# Patient Record
Sex: FEMALE | Race: WHITE | NOT HISPANIC OR LATINO | Employment: OTHER | URBAN - METROPOLITAN AREA
[De-identification: names, ages, dates, MRNs, and addresses within clinical notes are randomized per-mention and may not be internally consistent; named-entity substitution may affect disease eponyms.]

---

## 2023-06-21 LAB
APPEARANCE, URINE: ABNORMAL
BILIRUBIN, URINE: NEGATIVE
BLOOD, URINE: NEGATIVE
COLOR, URINE: YELLOW
GLUCOSE, URINE: NEGATIVE MG/DL
KETONES, URINE: NEGATIVE MG/DL
LEUKOCYTE ESTERASE, URINE: ABNORMAL
MUCUS, URINE: ABNORMAL /LPF
NITRITE, URINE: NEGATIVE
PH, URINE: 6 (ref 5–8)
PROTEIN, URINE: NEGATIVE MG/DL
RBC, URINE: <1 /HPF (ref 0–5)
SPECIFIC GRAVITY, URINE: 1.02 (ref 1–1.03)
SQUAMOUS EPITHELIAL CELLS, URINE: 9 /HPF
UROBILINOGEN, URINE: <2 MG/DL (ref 0–1.9)
WBC, URINE: 14 /HPF (ref 0–5)

## 2023-06-22 LAB — URINE CULTURE: ABNORMAL

## 2023-08-17 LAB
ALANINE AMINOTRANSFERASE (SGPT) (U/L) IN SER/PLAS: 10 U/L (ref 7–45)
ALBUMIN (G/DL) IN SER/PLAS: 4.2 G/DL (ref 3.4–5)
ALKALINE PHOSPHATASE (U/L) IN SER/PLAS: 62 U/L (ref 33–136)
ANION GAP IN SER/PLAS: 9 MMOL/L (ref 10–20)
ASPARTATE AMINOTRANSFERASE (SGOT) (U/L) IN SER/PLAS: 13 U/L (ref 9–39)
BAND NEUTROPHILS (10*3/UL) BLOOD MANUAL COUNT - WAM: 0.28 X10E9/L (ref 0–0.5)
BASOPHILS (10*3/UL) IN BLOOD BY MANUAL COUNT - WAM: 0 X10E9/L (ref 0–0.1)
BASOPHILS/100 LEUKOCYTES IN BLOOD BY MANUAL COUNT - WAM: 0 %
BILIRUBIN TOTAL (MG/DL) IN SER/PLAS: 0.4 MG/DL (ref 0–1.2)
CALCIUM (MG/DL) IN SER/PLAS: 9.3 MG/DL (ref 8.6–10.3)
CARBON DIOXIDE, TOTAL (MMOL/L) IN SER/PLAS: 25 MMOL/L (ref 21–32)
CHLORIDE (MMOL/L) IN SER/PLAS: 110 MMOL/L (ref 98–107)
CREATININE (MG/DL) IN SER/PLAS: 1.09 MG/DL (ref 0.5–1.05)
EOSINOPHILS (10*3/UL) IN BLOOD BY MANUAL COUNT - WAM: 0.14 X10E9/L (ref 0–0.4)
EOSINOPHILS/100 LEUKOCYTES IN BLOOD BY MANUAL COUNT - WAM: 1 % (ref 0–6)
ERYTHROCYTE DISTRIBUTION WIDTH (RATIO) BY AUTOMATED COUNT: 14.1 % (ref 11.5–14.5)
ERYTHROCYTE MEAN CORPUSCULAR HEMOGLOBIN CONCENTRATION (G/DL) BY AUTOMATED: 31.7 G/DL (ref 32–36)
ERYTHROCYTE MEAN CORPUSCULAR VOLUME (FL) BY AUTOMATED COUNT: 96 FL (ref 80–100)
ERYTHROCYTES (10*6/UL) IN BLOOD BY AUTOMATED COUNT: 4.36 X10E12/L (ref 4–5.2)
GFR FEMALE: 48 ML/MIN/1.73M2
GLUCOSE (MG/DL) IN SER/PLAS: 101 MG/DL (ref 74–99)
HEMATOCRIT (%) IN BLOOD BY AUTOMATED COUNT: 42 % (ref 36–46)
HEMOGLOBIN (G/DL) IN BLOOD: 13.3 G/DL (ref 12–16)
IMMATURE GRANULOCYTES/100 LEUKOCYTES IN BLOOD BY AUTOMATED COUNT: 0.3 % (ref 0–0.9)
LEUKOCYTES (10*3/UL) IN BLOOD BY AUTOMATED COUNT: 14 X10E9/L (ref 4.4–11.3)
LYMPHOCYTES (10*3/UL) IN BLOOD BY MANUAL COUNT - WAM: 7.84 X10E9/L (ref 0.8–3)
LYMPHOCYTES/100 LEUKOCYTES IN BLOOD BY MANUAL COUNT - WAM: 56 % (ref 13–44)
MANUAL DIFFERENTIAL Y/N: ABNORMAL
MONOCYTES (10*3/UL) IN BLOOD BY MANUAL COUNT - WAM: 0.42 X10E9/L (ref 0.05–0.8)
MONOCYTES/100 LEUKOCYTES IN BLOOD BY MANUAL COUNT - WAM: 3 % (ref 2–10)
NEUTROPHILS (SEGS+BANDS) (10*3/UL) MANUAL COUNT - WAM: 5.6 X10E9/L (ref 1.6–5.5)
NEUTROPHILS BAND FORM/100 LEUKOCYTES IN BLOOD BY MANUAL COUNT - WAM: 2 % (ref 0–5)
NRBC (PER 100 WBCS) BY AUTOMATED COUNT: 0 /100 WBC (ref 0–0)
PLATELETS (10*3/UL) IN BLOOD AUTOMATED COUNT: 255 X10E9/L (ref 150–450)
POTASSIUM (MMOL/L) IN SER/PLAS: 4.3 MMOL/L (ref 3.5–5.3)
PROTEIN TOTAL: 6.6 G/DL (ref 6.4–8.2)
RBC MORPHOLOGY IN BLOOD: NORMAL
SEGMENTED NEUTROPHILS (10*3/UL) BLOOD MANUAL - WAM: 5.32 X10E9/L (ref 1.6–5)
SEGMENTED NEUTROPHILS/100 LEUKOCYTES BY MANUAL COUNT -: 38 % (ref 40–80)
SODIUM (MMOL/L) IN SER/PLAS: 140 MMOL/L (ref 136–145)
UREA NITROGEN (MG/DL) IN SER/PLAS: 22 MG/DL (ref 6–23)

## 2023-08-19 LAB — URINE CULTURE: ABNORMAL

## 2023-10-25 ENCOUNTER — TELEPHONE (OUTPATIENT)
Dept: PRIMARY CARE | Facility: CLINIC | Age: 88
End: 2023-10-25
Payer: MEDICARE

## 2023-10-25 DIAGNOSIS — I48.0 PAROXYSMAL ATRIAL FIBRILLATION (MULTI): Primary | ICD-10-CM

## 2023-10-25 NOTE — TELEPHONE ENCOUNTER
Rx Refill Request Telephone Encounter    Name:  Becky Gonzalez  :  [id number]  Medication Name:  Eliquis   Dose : 5 mg   Route :    Frequency : 1 tab twice daily   Quantity : 60  Directions :    Specific Pharmacy location:  [facility]  Date of last appointment:  23  Date of next appointment:    Best number to reach patient:  [phone]

## 2023-11-16 ENCOUNTER — TELEPHONE (OUTPATIENT)
Dept: PRIMARY CARE | Facility: CLINIC | Age: 88
End: 2023-11-16
Payer: MEDICARE

## 2023-11-16 DIAGNOSIS — I25.10 CORONARY ARTERY DISEASE INVOLVING NATIVE HEART WITHOUT ANGINA PECTORIS, UNSPECIFIED VESSEL OR LESION TYPE: Primary | ICD-10-CM

## 2023-11-16 RX ORDER — LOVASTATIN 40 MG/1
40 TABLET ORAL DAILY
COMMUNITY
End: 2023-11-16 | Stop reason: SDUPTHER

## 2023-11-16 RX ORDER — LOVASTATIN 40 MG/1
40 TABLET ORAL DAILY
Qty: 90 TABLET | Refills: 3 | Status: SHIPPED | OUTPATIENT
Start: 2023-11-16 | End: 2024-11-15

## 2023-11-16 NOTE — TELEPHONE ENCOUNTER
Rx Refill Request Telephone Encounter    Name:  Becky Gonzalez  :  [id number]  Medication Name:  Lovastatin  Dose : 40 mg  Route : Tablet  Frequency : 1 per day  Quantity : 3 X 30 tab pack  Directions : Take one tablet daily as directed  Specific Pharmacy location:  [facility], on file  Date of last appointment:  23  Date of next appointment:  N/A

## 2023-12-12 PROBLEM — H35.363 DRUSEN (DEGENERATIVE) OF MACULA, BILATERAL: Status: ACTIVE | Noted: 2023-12-12

## 2023-12-12 PROBLEM — R42 POSTURAL DIZZINESS: Status: ACTIVE | Noted: 2023-12-12

## 2023-12-12 PROBLEM — J31.0 CHRONIC RHINITIS: Status: ACTIVE | Noted: 2023-12-12

## 2023-12-12 PROBLEM — R35.0 URINARY FREQUENCY: Status: ACTIVE | Noted: 2023-12-12

## 2023-12-12 PROBLEM — K64.9 HEMORRHOIDS: Status: ACTIVE | Noted: 2023-12-12

## 2023-12-12 PROBLEM — M51.369 DEGENERATIVE DISC DISEASE, LUMBAR: Status: ACTIVE | Noted: 2023-12-12

## 2023-12-12 PROBLEM — E55.9 VITAMIN D DEFICIENCY: Status: ACTIVE | Noted: 2023-12-12

## 2023-12-12 PROBLEM — M51.36 DEGENERATIVE DISC DISEASE, LUMBAR: Status: ACTIVE | Noted: 2023-12-12

## 2023-12-12 PROBLEM — R10.30 LOWER ABDOMINAL PAIN: Status: ACTIVE | Noted: 2023-12-12

## 2023-12-12 PROBLEM — N63.20 BREAST MASS, LEFT: Status: ACTIVE | Noted: 2023-12-12

## 2023-12-12 PROBLEM — E78.00 HYPERCHOLESTEROLEMIA: Chronic | Status: ACTIVE | Noted: 2023-12-12

## 2023-12-12 PROBLEM — E78.00 HYPERCHOLESTEROLEMIA: Status: ACTIVE | Noted: 2023-12-12

## 2023-12-12 PROBLEM — M47.816 FACET DEGENERATION OF LUMBAR REGION: Status: ACTIVE | Noted: 2023-12-12

## 2023-12-12 PROBLEM — I25.10 CORONARY ARTERY DISEASE: Chronic | Status: ACTIVE | Noted: 2023-11-16

## 2023-12-12 PROBLEM — H93.13 SUBJECTIVE TINNITUS OF BOTH EARS: Status: ACTIVE | Noted: 2023-12-12

## 2023-12-12 PROBLEM — M54.42 CHRONIC LEFT-SIDED LOW BACK PAIN WITH LEFT-SIDED SCIATICA: Status: ACTIVE | Noted: 2023-12-12

## 2023-12-12 PROBLEM — R00.1 BRADYCARDIA: Status: ACTIVE | Noted: 2023-12-12

## 2023-12-12 PROBLEM — R26.81 UNSTEADY GAIT: Status: ACTIVE | Noted: 2023-12-12

## 2023-12-12 PROBLEM — H25.11 AGE-RELATED NUCLEAR CATARACT OF RIGHT EYE: Status: ACTIVE | Noted: 2023-12-12

## 2023-12-12 PROBLEM — M25.562 CHRONIC PAIN OF LEFT KNEE: Status: ACTIVE | Noted: 2023-12-12

## 2023-12-12 PROBLEM — N18.31 STAGE 3A CHRONIC KIDNEY DISEASE (MULTI): Status: ACTIVE | Noted: 2023-12-12

## 2023-12-12 PROBLEM — M19.90 ARTHRITIS: Status: ACTIVE | Noted: 2023-12-12

## 2023-12-12 PROBLEM — R06.02 SHORTNESS OF BREATH: Status: ACTIVE | Noted: 2023-12-12

## 2023-12-12 PROBLEM — H60.60 CHRONIC OTITIS EXTERNA: Status: ACTIVE | Noted: 2023-12-12

## 2023-12-12 PROBLEM — K59.09 CONSTIPATION, CHRONIC: Status: ACTIVE | Noted: 2023-12-12

## 2023-12-12 PROBLEM — R13.10 DYSPHAGIA: Status: ACTIVE | Noted: 2023-12-12

## 2023-12-12 PROBLEM — R55 NEAR SYNCOPE: Status: ACTIVE | Noted: 2023-12-12

## 2023-12-12 PROBLEM — R00.2 PALPITATIONS: Status: ACTIVE | Noted: 2023-12-12

## 2023-12-12 PROBLEM — N64.4 BREAST PAIN, LEFT: Status: ACTIVE | Noted: 2023-12-12

## 2023-12-12 PROBLEM — G47.00 INSOMNIA: Status: ACTIVE | Noted: 2023-12-12

## 2023-12-12 PROBLEM — K63.5 COLON POLYPS: Status: ACTIVE | Noted: 2023-12-12

## 2023-12-12 PROBLEM — H25.12 AGE-RELATED NUCLEAR CATARACT OF LEFT EYE: Status: ACTIVE | Noted: 2023-12-12

## 2023-12-12 PROBLEM — J34.89 NASAL OBSTRUCTION: Status: ACTIVE | Noted: 2023-12-12

## 2023-12-12 PROBLEM — R07.9 CHEST PAIN: Status: ACTIVE | Noted: 2023-12-12

## 2023-12-12 PROBLEM — R05.9 COUGH: Status: ACTIVE | Noted: 2023-12-12

## 2023-12-12 PROBLEM — R33.9 INCOMPLETE BLADDER EMPTYING: Status: ACTIVE | Noted: 2023-12-12

## 2023-12-12 PROBLEM — F32.A MILD DEPRESSION: Status: ACTIVE | Noted: 2023-12-12

## 2023-12-12 PROBLEM — H90.3 SENSORINEURAL HEARING LOSS (SNHL) OF BOTH EARS: Status: ACTIVE | Noted: 2023-12-12

## 2023-12-12 PROBLEM — M17.9 OSTEOARTHRITIS OF KNEE: Status: ACTIVE | Noted: 2023-12-12

## 2023-12-12 PROBLEM — G89.29 CHRONIC LEFT-SIDED LOW BACK PAIN WITH LEFT-SIDED SCIATICA: Status: ACTIVE | Noted: 2023-12-12

## 2023-12-12 PROBLEM — G89.29 CHRONIC PAIN OF LEFT KNEE: Status: ACTIVE | Noted: 2023-12-12

## 2023-12-12 PROBLEM — N32.81 OAB (OVERACTIVE BLADDER): Status: ACTIVE | Noted: 2023-12-12

## 2023-12-12 PROBLEM — F41.1 ANXIETY, GENERALIZED: Status: ACTIVE | Noted: 2023-12-12

## 2023-12-12 PROBLEM — F41.8 SITUATIONAL ANXIETY: Status: ACTIVE | Noted: 2023-12-12

## 2023-12-12 PROBLEM — R09.81 NASAL CONGESTION: Status: ACTIVE | Noted: 2023-12-12

## 2023-12-13 ENCOUNTER — OFFICE VISIT (OUTPATIENT)
Dept: CARDIOLOGY | Facility: CLINIC | Age: 88
End: 2023-12-13
Payer: MEDICARE

## 2023-12-13 VITALS
BODY MASS INDEX: 29.38 KG/M2 | WEIGHT: 182 LBS | OXYGEN SATURATION: 98 % | DIASTOLIC BLOOD PRESSURE: 66 MMHG | HEART RATE: 65 BPM | SYSTOLIC BLOOD PRESSURE: 138 MMHG | RESPIRATION RATE: 16 BRPM

## 2023-12-13 DIAGNOSIS — I25.10 CORONARY ARTERY DISEASE INVOLVING NATIVE CORONARY ARTERY OF NATIVE HEART WITHOUT ANGINA PECTORIS: Chronic | ICD-10-CM

## 2023-12-13 DIAGNOSIS — I48.0 PAROXYSMAL ATRIAL FIBRILLATION (MULTI): Primary | ICD-10-CM

## 2023-12-13 DIAGNOSIS — R00.2 PALPITATIONS: ICD-10-CM

## 2023-12-13 PROCEDURE — 99214 OFFICE O/P EST MOD 30 MIN: CPT | Performed by: PHYSICIAN ASSISTANT

## 2023-12-13 PROCEDURE — 1159F MED LIST DOCD IN RCRD: CPT | Performed by: PHYSICIAN ASSISTANT

## 2023-12-13 RX ORDER — CALCIUM CARB/VITAMIN D3/VIT K1 500-500-40
TABLET,CHEWABLE ORAL
COMMUNITY

## 2023-12-13 RX ORDER — LOVASTATIN 40 MG/1
40 TABLET ORAL
COMMUNITY
Start: 2022-03-31 | End: 2023-12-13 | Stop reason: WASHOUT

## 2023-12-13 NOTE — PROGRESS NOTES
Chief Complaint:   Follow-up (6 month)     History Of Present Illness:    Becky Gonzalez is a 89 y.o. female presenting after recently completing a pharmacologic nuclear stress test.  Nuclear imaging displayed normal myocardial perfusion with preserved LV systolic function.  Patient denies chest pain, chest pressure, palpitations, dyspnea on exertion, shortness of breath at rest, diaphoresis, nausea/vomiting, back pain, headache, lightheadedness, dizziness, syncope or presyncopal episodes, active bleeding signs or symptoms, excessive weight gain, muscle or joint pain, claudication.       Last Recorded Vitals:  Vitals:    12/13/23 1411   BP: 138/66   BP Location: Right arm   Pulse: 65   Resp: 16   SpO2: 98%   Weight: 82.6 kg (182 lb)       Past Medical History:  She has a past medical history of Abnormal findings on diagnostic imaging of other specified body structures, Coronary artery disease (11/16/2023), Hypercholesterolemia (12/12/2023), Other specified anxiety disorders (07/19/2021), Personal history of other diseases of the circulatory system, Personal history of other diseases of the nervous system and sense organs, Personal history of other medical treatment, and Personal history of other mental and behavioral disorders.    Past Surgical History:  She has a past surgical history that includes Knee surgery (03/02/2020); Other surgical history (03/16/2022); and Other surgical history (04/28/2020).      Social History:  She has no history on file for tobacco use, alcohol use, and drug use.    Family History:  No family history on file.     Allergies:  Patient has no known allergies.    Outpatient Medications:  Current Outpatient Medications   Medication Instructions    apixaban (ELIQUIS) 5 mg, oral, Every 12 hours    cholecalciferol (Vitamin D-3) 400 unit capsule oral    lovastatin (MEVACOR) 40 mg, oral, Daily, as directed       Physical Exam:  Constitutional: awake and alert, oriented ×3, no apparent  "distress  Skin: warm, dry, good turgor no obvious lesions  Eyes: pupils equal, round, reactive to light, conjunctiva pink and noninjected, no discharge  HENT: normocephalic and atraumatic, mucous membranes moist, trachea midline with no masses/goiter  Cardiovascular: S1/S2 regular, no murmur no rubs/gallops, no carotid bruits, no JVD  Pulmonary: symmetrical chest expansion, lungs are clear to auscultation bilaterally, no wheezes/rales/rhonchi, normal effort  Abdomen: nontender, nondistended, active bowel sounds, no ascites  Extremities: no cyanosis, clubbing, no LE edema no lesions; palpable pedal pulses  Neurologic: cranial nerves II - XII grossly intact, stable gait, no tremor       Last Labs:  CBC -  Lab Results   Component Value Date    WBC 14.0 (H) 08/17/2023    HGB 13.3 08/17/2023    HCT 42.0 08/17/2023    MCV 96 08/17/2023     08/17/2023       CMP -  Lab Results   Component Value Date    CALCIUM 9.3 08/17/2023    PROT 6.6 08/17/2023    ALBUMIN 4.2 08/17/2023    AST 13 08/17/2023    ALT 10 08/17/2023    ALKPHOS 62 08/17/2023    BILITOT 0.4 08/17/2023       LIPID PANEL -   Lab Results   Component Value Date    CHOL 183 01/21/2021    TRIG 142 01/21/2021    HDL 53.4 01/21/2021    CHHDL 3.4 01/21/2021    LDLF 101 (H) 01/21/2021    VLDL 28 01/21/2021       RENAL FUNCTION PANEL -   Lab Results   Component Value Date    GLUCOSE 101 (H) 08/17/2023     08/17/2023    K 4.3 08/17/2023     (H) 08/17/2023    CO2 25 08/17/2023    ANIONGAP 9 (L) 08/17/2023    BUN 22 08/17/2023    CREATININE 1.09 (H) 08/17/2023    CALCIUM 9.3 08/17/2023    ALBUMIN 4.2 08/17/2023        No results found for: \"BNP\", \"HGBA1C\"    Last Cardiology Tests:  ECG:  No results found for this or any previous visit from the past 1095 days.      Echo:  No results found for this or any previous visit from the past 1095 days.      Ejection Fractions:  No results found for: \"EF\"    Cath:  No results found for this or any previous visit from " the past 1095 days.      Stress Test:  Nuclear Stress Test 07/20/2023      Cardiac Imaging:  No results found for this or any previous visit from the past 1095 days.      Assessment/Plan   Problem List Items Addressed This Visit             ICD-10-CM       Cardiac and Vasculature    Coronary artery disease (Chronic) I25.10    Paroxysmal atrial fibrillation (CMS/HCC) - Primary I48.0    Palpitations R00.2     -Negative pharmacologic nuclear stress test for ischemia    -Continue DOAC for CVA prophylaxis    -Continue all other current GDMT as prescribed    -F/U with Dr. Smith as scheduled    JORGE VenegasC

## 2024-05-13 ENCOUNTER — TELEPHONE (OUTPATIENT)
Dept: CARDIOLOGY | Facility: CLINIC | Age: 89
End: 2024-05-13
Payer: MEDICARE

## 2024-05-13 DIAGNOSIS — I25.10 CORONARY ARTERY DISEASE INVOLVING NATIVE CORONARY ARTERY OF NATIVE HEART WITHOUT ANGINA PECTORIS: ICD-10-CM

## 2024-05-13 NOTE — TELEPHONE ENCOUNTER
Pt wanted a appt. This week, not next week [place] be [place] of the country.  She said is having a stressful week and she thinks she might be having a panic attack and she was done talking to me .  She had to go to [facility] the earliest appt I had was with oscar @1300 on the 24 of may.. She also never spoke with her pcp yet about this.  She stressed out

## 2024-05-14 NOTE — TELEPHONE ENCOUNTER
Left detailed message for patient: Her June 5th appt has been rescheduled to 5/24 at !:30pm with LANCE at [place] office. Order has been placed for exercise stress test to be performed before LANCE visit on 5/24. Testing will be in contact to schedule.  Requested call back if any questions/concerns.

## 2024-05-14 NOTE — TELEPHONE ENCOUNTER
Patient stated she was very stressed, nervous yesterday and felt a pain over left breast. Has subsided after taking some deep breaths. States she has been having pains on and off and relates to anxiety. Does have medication to take if she needs it- Propranolol 20mg bid prn anxiety and sob. Neels ok today.  Going to [place] sometime this month- has home there. Will be gone for a couple of months.     Patient has OV w/Dr. Smith on 6/5/24- will need to reschedule and asking if she can be seen within 2 weeks to make sure her heart is ok.

## 2024-05-23 ENCOUNTER — HOSPITAL ENCOUNTER (OUTPATIENT)
Dept: CARDIOLOGY | Facility: CLINIC | Age: 89
Discharge: HOME | End: 2024-05-23
Payer: MEDICARE

## 2024-05-23 ENCOUNTER — HOSPITAL ENCOUNTER (OUTPATIENT)
Dept: RADIOLOGY | Facility: CLINIC | Age: 89
Discharge: HOME | End: 2024-05-23
Payer: MEDICARE

## 2024-05-23 DIAGNOSIS — I25.10 CORONARY ARTERY DISEASE INVOLVING NATIVE CORONARY ARTERY OF NATIVE HEART WITHOUT ANGINA PECTORIS: ICD-10-CM

## 2024-05-23 DIAGNOSIS — R06.02 SHORTNESS OF BREATH: Primary | ICD-10-CM

## 2024-05-23 DIAGNOSIS — E55.9 VITAMIN D DEFICIENCY: ICD-10-CM

## 2024-05-23 DIAGNOSIS — R00.2 PALPITATIONS: Primary | ICD-10-CM

## 2024-05-23 DIAGNOSIS — R07.9 CHEST PAIN: Primary | ICD-10-CM

## 2024-05-23 PROBLEM — S93.601A SPRAIN OF RIGHT FOOT: Status: ACTIVE | Noted: 2023-05-30

## 2024-05-23 PROBLEM — M20.61 DEFORMITY OF TOE OF RIGHT FOOT: Status: ACTIVE | Noted: 2023-05-30

## 2024-05-23 PROBLEM — M76.821 POSTERIOR TIBIAL TENDINITIS OF RIGHT LEG: Status: ACTIVE | Noted: 2023-05-30

## 2024-05-23 PROBLEM — M79.674 PAIN IN TOE OF RIGHT FOOT: Status: ACTIVE | Noted: 2023-05-30

## 2024-05-23 PROBLEM — M25.371 RIGHT ANKLE INSTABILITY: Status: ACTIVE | Noted: 2023-05-30

## 2024-05-23 PROBLEM — K21.9 GASTROESOPHAGEAL REFLUX DISEASE: Status: ACTIVE | Noted: 2019-12-27

## 2024-05-23 PROBLEM — E53.8 VITAMIN B 12 DEFICIENCY: Status: ACTIVE | Noted: 2023-12-04

## 2024-05-23 PROBLEM — N18.9 CHRONIC KIDNEY DISEASE: Status: ACTIVE | Noted: 2022-04-21

## 2024-05-23 PROBLEM — M25.471 SWELLING OF RIGHT ANKLE JOINT: Status: ACTIVE | Noted: 2023-05-30

## 2024-05-23 PROBLEM — M21.41 ACQUIRED PES PLANUS OF RIGHT FOOT: Status: ACTIVE | Noted: 2023-05-30

## 2024-05-23 PROBLEM — M21.42 ACQUIRED PES PLANUS OF LEFT FOOT: Status: ACTIVE | Noted: 2023-05-30

## 2024-05-23 PROBLEM — R32 URINARY INCONTINENCE: Status: ACTIVE | Noted: 2023-12-04

## 2024-05-23 PROBLEM — R03.0 ELEVATED BLOOD PRESSURE READING: Status: ACTIVE | Noted: 2023-12-04

## 2024-05-23 PROBLEM — M19.071 PRIMARY OSTEOARTHRITIS OF RIGHT ANKLE: Status: ACTIVE | Noted: 2023-05-30

## 2024-05-23 PROBLEM — M62.81 MUSCLE WEAKNESS (GENERALIZED): Status: ACTIVE | Noted: 2020-07-02

## 2024-05-23 PROCEDURE — 2500000004 HC RX 250 GENERAL PHARMACY W/ HCPCS (ALT 636 FOR OP/ED): Performed by: INTERNAL MEDICINE

## 2024-05-23 PROCEDURE — 78452 HT MUSCLE IMAGE SPECT MULT: CPT | Performed by: INTERNAL MEDICINE

## 2024-05-23 PROCEDURE — 93018 CV STRESS TEST I&R ONLY: CPT | Performed by: INTERNAL MEDICINE

## 2024-05-23 PROCEDURE — 78452 HT MUSCLE IMAGE SPECT MULT: CPT

## 2024-05-23 PROCEDURE — 93016 CV STRESS TEST SUPVJ ONLY: CPT | Performed by: INTERNAL MEDICINE

## 2024-05-23 PROCEDURE — 93017 CV STRESS TEST TRACING ONLY: CPT

## 2024-05-23 RX ORDER — REGADENOSON 0.08 MG/ML
0.4 INJECTION, SOLUTION INTRAVENOUS ONCE
Status: COMPLETED | OUTPATIENT
Start: 2024-05-23 | End: 2024-05-23

## 2024-05-23 RX ADMIN — REGADENOSON 0.4 MG: 0.08 INJECTION, SOLUTION INTRAVENOUS at 11:37

## 2024-05-24 ENCOUNTER — OFFICE VISIT (OUTPATIENT)
Dept: CARDIOLOGY | Facility: CLINIC | Age: 89
End: 2024-05-24
Payer: MEDICARE

## 2024-05-24 VITALS
DIASTOLIC BLOOD PRESSURE: 80 MMHG | HEIGHT: 66 IN | BODY MASS INDEX: 29.89 KG/M2 | SYSTOLIC BLOOD PRESSURE: 140 MMHG | WEIGHT: 186 LBS

## 2024-05-24 DIAGNOSIS — I48.0 PAROXYSMAL ATRIAL FIBRILLATION (MULTI): ICD-10-CM

## 2024-05-24 DIAGNOSIS — I25.10 CORONARY ARTERY DISEASE INVOLVING NATIVE CORONARY ARTERY OF NATIVE HEART WITHOUT ANGINA PECTORIS: Primary | Chronic | ICD-10-CM

## 2024-05-24 DIAGNOSIS — E78.00 HYPERCHOLESTEROLEMIA: Chronic | ICD-10-CM

## 2024-05-24 DIAGNOSIS — R07.89 OTHER CHEST PAIN: ICD-10-CM

## 2024-05-24 PROCEDURE — 99214 OFFICE O/P EST MOD 30 MIN: CPT | Performed by: PHYSICIAN ASSISTANT

## 2024-05-24 PROCEDURE — 1160F RVW MEDS BY RX/DR IN RCRD: CPT | Performed by: PHYSICIAN ASSISTANT

## 2024-05-24 PROCEDURE — 1159F MED LIST DOCD IN RCRD: CPT | Performed by: PHYSICIAN ASSISTANT

## 2024-05-24 NOTE — PROGRESS NOTES
"Chief Complaint:   Follow-up and Atrial Fibrillation     History Of Present Illness:    5/24/24  Patient completed a pharmacologic nuclear stress test yesterday displaying normal myocardial perfusion in response to chemical stress, stress LVEF 80%.    12/13/23  Becky Gonzalez is a 89 y.o. female presenting after recently completing a pharmacologic nuclear stress test.  Nuclear imaging displayed normal myocardial perfusion with preserved LV systolic function.  Patient denies chest pain, chest pressure, palpitations, dyspnea on exertion, shortness of breath at rest, diaphoresis, nausea/vomiting, back pain, headache, lightheadedness, dizziness, syncope or presyncopal episodes, active bleeding signs or symptoms, excessive weight gain, muscle or joint pain, claudication.       Last Recorded Vitals:  Vitals:    05/24/24 1337   BP: 140/80   BP Location: Right arm   Patient Position: Sitting   BP Cuff Size: Adult   Weight: 84.4 kg (186 lb)   Height: 1.676 m (5' 6\")       Past Medical History:  She has a past medical history of Abnormal findings on diagnostic imaging of other specified body structures, Coronary artery disease (11/16/2023), Hypercholesterolemia (12/12/2023), Other specified anxiety disorders (07/19/2021), Personal history of other diseases of the circulatory system, Personal history of other diseases of the nervous system and sense organs, Personal history of other medical treatment, and Personal history of other mental and behavioral disorders.    Past Surgical History:  She has a past surgical history that includes Knee surgery (03/02/2020); Other surgical history (03/16/2022); and Other surgical history (04/28/2020).      Social History:  She has no history on file for tobacco use, alcohol use, and drug use.    Family History:  No family history on file.     Allergies:  Patient has no known allergies.    Outpatient Medications:  Current Outpatient Medications   Medication Instructions    " "apixaban (ELIQUIS) 5 mg, oral, Every 12 hours    cholecalciferol (Vitamin D-3) 400 unit capsule oral    lovastatin (MEVACOR) 40 mg, oral, Daily, as directed       Physical Exam:  Constitutional: awake and alert, oriented ×3, no apparent distress  Skin: warm, dry, good turgor no obvious lesions  Eyes: pupils equal, round, reactive to light, conjunctiva pink and noninjected, no discharge  HENT: normocephalic and atraumatic, mucous membranes moist, trachea midline with no masses/goiter  Cardiovascular: S1/S2 regular, no murmur no rubs/gallops, no carotid bruits, no JVD  Pulmonary: symmetrical chest expansion, lungs are clear to auscultation bilaterally, no wheezes/rales/rhonchi, normal effort  Abdomen: nontender, nondistended, active bowel sounds, no ascites  Extremities: no cyanosis, clubbing, no LE edema no lesions; palpable pedal pulses  Neurologic: cranial nerves II - XII grossly intact, stable gait, no tremor       Last Labs:  CBC -  Lab Results   Component Value Date    WBC 14.0 (H) 08/17/2023    HGB 13.3 08/17/2023    HCT 42.0 08/17/2023    MCV 96 08/17/2023     08/17/2023       CMP -  Lab Results   Component Value Date    CALCIUM 9.3 08/17/2023    PROT 6.6 08/17/2023    ALBUMIN 4.2 08/17/2023    AST 13 08/17/2023    ALT 10 08/17/2023    ALKPHOS 62 08/17/2023    BILITOT 0.4 08/17/2023       LIPID PANEL -   Lab Results   Component Value Date    CHOL 183 01/21/2021    TRIG 142 01/21/2021    HDL 53.4 01/21/2021    CHHDL 3.4 01/21/2021    LDLF 101 (H) 01/21/2021    VLDL 28 01/21/2021       RENAL FUNCTION PANEL -   Lab Results   Component Value Date    GLUCOSE 101 (H) 08/17/2023     08/17/2023    K 4.3 08/17/2023     (H) 08/17/2023    CO2 25 08/17/2023    ANIONGAP 9 (L) 08/17/2023    BUN 22 08/17/2023    CREATININE 1.09 (H) 08/17/2023    CALCIUM 9.3 08/17/2023    ALBUMIN 4.2 08/17/2023        No results found for: \"BNP\", \"HGBA1C\"    Last Cardiology Tests:  ECG:  No results found for this or any " "previous visit from the past 1095 days.      Echo:  No results found for this or any previous visit from the past 1095 days.      Ejection Fractions:  No results found for: \"EF\"    Cath:  No results found for this or any previous visit from the past 1095 days.      Stress Test:  Nuclear Stress Test 07/20/2023      Cardiac Imaging:  No results found for this or any previous visit from the past 1095 days.      Assessment/Plan   Problem List Items Addressed This Visit             ICD-10-CM       Cardiac and Vasculature    Coronary artery disease - Primary (Chronic) I25.10    Hypercholesterolemia (Chronic) E78.00    Paroxysmal atrial fibrillation (Multi) I48.0    Chest pain R07.9       -Negative pharmacologic nuclear stress test for ischemia    -Continue DOAC for CVA prophylaxis    -Continue all other current GDMT as prescribed    -F/U with Dr. Smith as scheduled    Nikita Hernandez PA-C  "

## 2024-06-05 ENCOUNTER — APPOINTMENT (OUTPATIENT)
Dept: CARDIOLOGY | Facility: CLINIC | Age: 89
End: 2024-06-05
Payer: MEDICARE

## 2024-06-17 ENCOUNTER — APPOINTMENT (OUTPATIENT)
Dept: PRIMARY CARE | Facility: CLINIC | Age: 89
End: 2024-06-17
Payer: MEDICARE

## 2024-06-17 VITALS
WEIGHT: 186 LBS | HEART RATE: 72 BPM | OXYGEN SATURATION: 94 % | BODY MASS INDEX: 30.02 KG/M2 | SYSTOLIC BLOOD PRESSURE: 118 MMHG | TEMPERATURE: 97.3 F | DIASTOLIC BLOOD PRESSURE: 64 MMHG

## 2024-06-17 DIAGNOSIS — N30.00 ACUTE CYSTITIS WITHOUT HEMATURIA: ICD-10-CM

## 2024-06-17 DIAGNOSIS — R19.4 CHANGE IN STOOL HABITS: ICD-10-CM

## 2024-06-17 DIAGNOSIS — I25.10 CORONARY ARTERY DISEASE INVOLVING NATIVE CORONARY ARTERY OF NATIVE HEART WITHOUT ANGINA PECTORIS: Primary | Chronic | ICD-10-CM

## 2024-06-17 DIAGNOSIS — Z00.00 ENCOUNTER FOR ANNUAL PHYSICAL EXAM: ICD-10-CM

## 2024-06-17 DIAGNOSIS — R10.84 GENERALIZED ABDOMINAL PAIN: ICD-10-CM

## 2024-06-17 DIAGNOSIS — I48.0 PAROXYSMAL ATRIAL FIBRILLATION (MULTI): ICD-10-CM

## 2024-06-17 DIAGNOSIS — R35.0 INCREASED URINARY FREQUENCY: ICD-10-CM

## 2024-06-17 DIAGNOSIS — R30.9 PAIN WITH URINATION: ICD-10-CM

## 2024-06-17 LAB
POC APPEARANCE, URINE: CLEAR
POC BILIRUBIN, URINE: NEGATIVE
POC BLOOD, URINE: NEGATIVE
POC COLOR, URINE: YELLOW
POC GLUCOSE, URINE: NEGATIVE MG/DL
POC KETONES, URINE: NEGATIVE MG/DL
POC LEUKOCYTES, URINE: ABNORMAL
POC NITRITE,URINE: NEGATIVE
POC PH, URINE: 6 PH
POC PROTEIN, URINE: ABNORMAL MG/DL
POC SPECIFIC GRAVITY, URINE: 1.02
POC UROBILINOGEN, URINE: 0.2 EU/DL

## 2024-06-17 PROCEDURE — 1036F TOBACCO NON-USER: CPT | Performed by: NURSE PRACTITIONER

## 2024-06-17 PROCEDURE — 1158F ADVNC CARE PLAN TLK DOCD: CPT | Performed by: NURSE PRACTITIONER

## 2024-06-17 PROCEDURE — 82570 ASSAY OF URINE CREATININE: CPT

## 2024-06-17 PROCEDURE — 99214 OFFICE O/P EST MOD 30 MIN: CPT | Performed by: NURSE PRACTITIONER

## 2024-06-17 PROCEDURE — 84156 ASSAY OF PROTEIN URINE: CPT

## 2024-06-17 PROCEDURE — 1123F ACP DISCUSS/DSCN MKR DOCD: CPT | Performed by: NURSE PRACTITIONER

## 2024-06-17 PROCEDURE — 81002 URINALYSIS NONAUTO W/O SCOPE: CPT | Performed by: NURSE PRACTITIONER

## 2024-06-17 PROCEDURE — 1159F MED LIST DOCD IN RCRD: CPT | Performed by: NURSE PRACTITIONER

## 2024-06-17 RX ORDER — OXYBUTYNIN CHLORIDE 5 MG/1
5 TABLET, EXTENDED RELEASE ORAL DAILY
Qty: 30 TABLET | Refills: 11 | Status: SHIPPED | OUTPATIENT
Start: 2024-06-17 | End: 2024-06-21 | Stop reason: SDUPTHER

## 2024-06-17 RX ORDER — NITROFURANTOIN 25; 75 MG/1; MG/1
100 CAPSULE ORAL 2 TIMES DAILY
Qty: 10 CAPSULE | Refills: 0 | Status: SHIPPED | OUTPATIENT
Start: 2024-06-17 | End: 2024-06-22

## 2024-06-17 ASSESSMENT — ENCOUNTER SYMPTOMS
HEMATOLOGIC/LYMPHATIC NEGATIVE: 1
EYES NEGATIVE: 1
ABDOMINAL PAIN: 1
DIZZINESS: 0
FACIAL ASYMMETRY: 0
CARDIOVASCULAR NEGATIVE: 1
NEUROLOGICAL NEGATIVE: 1
WOUND: 0
WEAKNESS: 0
CONSTITUTIONAL NEGATIVE: 1
DIFFICULTY URINATING: 1
PSYCHIATRIC NEGATIVE: 1
MUSCULOSKELETAL NEGATIVE: 1
NERVOUS/ANXIOUS: 0
DIARRHEA: 1
POLYPHAGIA: 0
SLEEP DISTURBANCE: 0
LIGHT-HEADEDNESS: 0
RESPIRATORY NEGATIVE: 1
CONFUSION: 0

## 2024-06-17 ASSESSMENT — PATIENT HEALTH QUESTIONNAIRE - PHQ9
SUM OF ALL RESPONSES TO PHQ9 QUESTIONS 1 AND 2: 0
2. FEELING DOWN, DEPRESSED OR HOPELESS: NOT AT ALL
1. LITTLE INTEREST OR PLEASURE IN DOING THINGS: NOT AT ALL

## 2024-06-17 NOTE — PROGRESS NOTES
"Subjective   Patient ID: Becky Gonzalez is a 89 y.o. female who presents for Establish Care.    ANNEMARIE Pena in a pleasant 89 y/o female presents to clinic to b care     Of note she complaints of worsening Urinary s/s with pain and frequency also states her stool is \" too lose and soft and has abd pain\"         Follows with cardiology:   Dr Smith  Recent stress test completed :   05/2024  Patient completed a pharmacologic nuclear stress test displaying normal myocardial perfusion in response to chemical stress, stress LVEF 80%.    Past Medical History:  GERD, anxiety, depression, HLD, vitamin D deficiency, OA, CKD, Back pain    Past Surgical History:  She has a past surgical history that includes Knee surgery (03/02/2020); Other surgical history (03/16/2022); and Other surgical history (04/28/2020).      Social History:  Tobacco Use   Smoking status: Never   Smokeless tobacco: Never   Vaping Use   Vaping Use: Never used   Substance and Sexual Activity   Alcohol use: Yes   Comment: wine occ.   Drug use: Not on file   Sexual activity: Not on file     Review of Systems   Constitutional: Negative.    HENT:  Positive for hearing loss.    Eyes: Negative.    Respiratory: Negative.     Cardiovascular: Negative.    Gastrointestinal:  Positive for abdominal pain and diarrhea.   Endocrine: Negative for polyphagia.   Genitourinary:  Positive for difficulty urinating, pelvic pain and urgency.   Musculoskeletal: Negative.    Skin: Negative.  Negative for pallor, rash and wound.   Neurological: Negative.  Negative for dizziness, facial asymmetry, weakness and light-headedness.   Hematological: Negative.    Psychiatric/Behavioral: Negative.  Negative for confusion, sleep disturbance and suicidal ideas. The patient is not nervous/anxious.    All other systems reviewed and are negative.      Objective   /64   Pulse 72   Temp 36.3 °C (97.3 °F)   Wt 84.4 kg (186 lb)   SpO2 94%   BMI 30.02 kg/m²     Physical " Exam  Vitals and nursing note reviewed.   Constitutional:       Appearance: Normal appearance. She is normal weight.   HENT:      Head: Normocephalic.      Nose: Nose normal.      Mouth/Throat:      Mouth: Mucous membranes are moist.   Eyes:      Extraocular Movements: Extraocular movements intact.      Conjunctiva/sclera: Conjunctivae normal.      Pupils: Pupils are equal, round, and reactive to light.   Cardiovascular:      Rate and Rhythm: Normal rate and regular rhythm.      Pulses: Normal pulses.      Heart sounds: Normal heart sounds.   Pulmonary:      Effort: Pulmonary effort is normal.   Abdominal:      General: Abdomen is flat. Bowel sounds are normal.      Palpations: Abdomen is soft.   Musculoskeletal:         General: Normal range of motion.      Cervical back: Normal range of motion.   Skin:     General: Skin is warm and dry.   Neurological:      General: No focal deficit present.      Mental Status: She is alert and oriented to person, place, and time.   Psychiatric:         Mood and Affect: Mood normal.         Behavior: Behavior normal.         Assessment/Plan   1. Coronary artery disease involving native coronary artery of native heart without angina pectoris  Stable continue with cardiology     2. Paroxysmal atrial fibrillation (Multi)  Stable continue DOAC as ordered     3. Increased urinary frequency  - POCT UA (nonautomated) manually resulted  - Urine Culture; Future  - oxybutynin XL (Ditropan XL) 5 mg 24 hr tablet; Take 1 tablet (5 mg) by mouth once daily. Do not crush, chew, or split.  Dispense: 30 tablet; Refill: 11  - Referral to Physical Therapy; Future  - CT abdomen pelvis wo IV contrast; Future    4. Pain with urination  - POCT UA (nonautomated) manually resulted  - Urine Culture; Future  - Protein, Urine Random; Future  - CT abdomen pelvis wo IV contrast; Future    5. Encounter for annual physical exam  - Hemoglobin A1C; Future  - CBC; Future  - Comprehensive Metabolic Panel; Future  -  Lipid Panel; Future  - TSH with reflex to Free T4 if abnormal; Future  - Triiodothyronine, Total; Future    6. BMI 30.0-30.9,adult  - CBC; Future    7. Acute cystitis without hematuria  - nitrofurantoin, macrocrystal-monohydrate, (Macrobid) 100 mg capsule; Take 1 capsule (100 mg) by mouth 2 times a day for 5 days.  Dispense: 10 capsule; Refill: 0    8. Change in stool habits  - CT abdomen pelvis wo IV contrast; Future    9. Generalized abdominal pain    - CT abdomen pelvis wo IV contrast; Future      FU in 1 month for bladder/ urinary assessment

## 2024-06-18 LAB
CREAT UR-MCNC: 132.1 MG/DL (ref 20–320)
PROT UR-ACNC: 19 MG/DL (ref 5–24)
PROT/CREAT UR: 0.14 MG/MG CREAT (ref 0–0.17)

## 2024-06-20 ENCOUNTER — HOSPITAL ENCOUNTER (OUTPATIENT)
Dept: RADIOLOGY | Facility: CLINIC | Age: 89
Discharge: HOME | End: 2024-06-20
Payer: MEDICARE

## 2024-06-20 DIAGNOSIS — R35.0 INCREASED URINARY FREQUENCY: ICD-10-CM

## 2024-06-20 DIAGNOSIS — R10.84 GENERALIZED ABDOMINAL PAIN: ICD-10-CM

## 2024-06-20 DIAGNOSIS — R19.4 CHANGE IN STOOL HABITS: ICD-10-CM

## 2024-06-20 DIAGNOSIS — R30.9 PAIN WITH URINATION: ICD-10-CM

## 2024-06-20 PROCEDURE — 74176 CT ABD & PELVIS W/O CONTRAST: CPT

## 2024-06-21 RX ORDER — OXYBUTYNIN CHLORIDE 5 MG/1
TABLET, EXTENDED RELEASE ORAL
Qty: 90 TABLET | Refills: 3 | Status: SHIPPED | OUTPATIENT
Start: 2024-06-21 | End: 2024-06-24 | Stop reason: WASHOUT

## 2024-06-21 NOTE — TELEPHONE ENCOUNTER
Patient called to talk about her prescription  that she was prescribed.  Please call patient to advise on medication she said she called the office at [facility] but its busy

## 2024-06-24 ENCOUNTER — TELEPHONE (OUTPATIENT)
Dept: PRIMARY CARE | Facility: CLINIC | Age: 89
End: 2024-06-24
Payer: MEDICARE

## 2024-06-24 DIAGNOSIS — N39.41 URGE INCONTINENCE OF URINE: Primary | ICD-10-CM

## 2024-06-24 RX ORDER — OXYBUTYNIN CHLORIDE 5 MG/1
5 TABLET ORAL 2 TIMES DAILY
Qty: 60 TABLET | Refills: 5 | Status: SHIPPED | OUTPATIENT
Start: 2024-06-24 | End: 2024-12-21

## 2024-06-24 NOTE — TELEPHONE ENCOUNTER
Patient called stating that urine medication was not working and would like to know id alternative could be prescribed. Please advise.

## 2024-06-26 DIAGNOSIS — Q63.9 RENAL STRUCTURAL ABNORMALITY: ICD-10-CM

## 2024-06-26 DIAGNOSIS — N28.1 RENAL CYST: Primary | ICD-10-CM

## 2024-06-28 ENCOUNTER — HOSPITAL ENCOUNTER (OUTPATIENT)
Dept: RADIOLOGY | Facility: CLINIC | Age: 89
Discharge: HOME | End: 2024-06-28
Payer: MEDICARE

## 2024-06-28 DIAGNOSIS — N28.1 RENAL CYST: ICD-10-CM

## 2024-06-28 DIAGNOSIS — Q63.9 RENAL STRUCTURAL ABNORMALITY: ICD-10-CM

## 2024-06-28 PROCEDURE — 76770 US EXAM ABDO BACK WALL COMP: CPT

## 2024-07-09 ENCOUNTER — TELEPHONE (OUTPATIENT)
Dept: CARDIOLOGY | Facility: CLINIC | Age: 89
End: 2024-07-09
Payer: MEDICARE

## 2024-07-09 NOTE — TELEPHONE ENCOUNTER
"Patient stated she is depressed and anxious \"stressed out\". Wanted to know if is ok to take SSRI's?  Although she doesn't want to take a pill every day. Does Dr. Smith know of a prn anti-anxiety med that would be ok take with her heart condition?    Stated JETHRO Hernandez had rx'd something for her previously that worked for her.  Looks like at OV 12/12/22 he had rx'd Propranolol 20mg bid prn for  anxiety and palpitations.    Will you re-prescribe?  "

## 2024-07-10 ENCOUNTER — TELEPHONE (OUTPATIENT)
Dept: PRIMARY CARE | Facility: CLINIC | Age: 89
End: 2024-07-10
Payer: MEDICARE

## 2024-07-10 DIAGNOSIS — R35.0 INCREASED URINARY FREQUENCY: Primary | ICD-10-CM

## 2024-07-10 NOTE — PROGRESS NOTES
uSubjective   Patient ID: Becky Gonzalez is a 89 y.o. female who presents for No chief complaint on file..  HPI    Review of Systems    Objective   Physical Exam    Assessment/Plan            Gretta Crabtree DO 07/10/24 4:18 PM

## 2024-07-15 ENCOUNTER — APPOINTMENT (OUTPATIENT)
Dept: PRIMARY CARE | Facility: CLINIC | Age: 89
End: 2024-07-15
Payer: MEDICARE

## 2024-07-15 VITALS
BODY MASS INDEX: 30.05 KG/M2 | HEIGHT: 66 IN | OXYGEN SATURATION: 98 % | DIASTOLIC BLOOD PRESSURE: 64 MMHG | SYSTOLIC BLOOD PRESSURE: 112 MMHG | WEIGHT: 187 LBS | HEART RATE: 64 BPM

## 2024-07-15 DIAGNOSIS — M85.80 DECREASED BONE DENSITY: ICD-10-CM

## 2024-07-15 DIAGNOSIS — Z00.00 ENCOUNTER FOR ANNUAL PHYSICAL EXAM: ICD-10-CM

## 2024-07-15 DIAGNOSIS — F41.1 ANXIETY, GENERALIZED: Primary | ICD-10-CM

## 2024-07-15 DIAGNOSIS — F41.1 ANXIETY, GENERALIZED: ICD-10-CM

## 2024-07-15 DIAGNOSIS — Z91.81 AT RISK FOR FALLS: ICD-10-CM

## 2024-07-15 DIAGNOSIS — R10.84 GENERALIZED ABDOMINAL PAIN: ICD-10-CM

## 2024-07-15 DIAGNOSIS — N95.8 LOW BONE DENSITY IN PREMENOPAUSAL PATIENT: ICD-10-CM

## 2024-07-15 DIAGNOSIS — K59.09 CONSTIPATION, CHRONIC: ICD-10-CM

## 2024-07-15 DIAGNOSIS — R53.1 WEAKNESS: ICD-10-CM

## 2024-07-15 DIAGNOSIS — Z01.10 ENCOUNTER FOR HEARING EXAMINATION, UNSPECIFIED WHETHER ABNORMAL FINDINGS: ICD-10-CM

## 2024-07-15 DIAGNOSIS — Z00.00 ROUTINE GENERAL MEDICAL EXAMINATION AT HEALTH CARE FACILITY: Primary | ICD-10-CM

## 2024-07-15 DIAGNOSIS — M85.9 LOW BONE DENSITY IN PREMENOPAUSAL PATIENT: ICD-10-CM

## 2024-07-15 PROCEDURE — G0439 PPPS, SUBSEQ VISIT: HCPCS | Performed by: NURSE PRACTITIONER

## 2024-07-15 PROCEDURE — 1170F FXNL STATUS ASSESSED: CPT | Performed by: NURSE PRACTITIONER

## 2024-07-15 PROCEDURE — 1123F ACP DISCUSS/DSCN MKR DOCD: CPT | Performed by: NURSE PRACTITIONER

## 2024-07-15 PROCEDURE — 1158F ADVNC CARE PLAN TLK DOCD: CPT | Performed by: NURSE PRACTITIONER

## 2024-07-15 PROCEDURE — 1159F MED LIST DOCD IN RCRD: CPT | Performed by: NURSE PRACTITIONER

## 2024-07-15 PROCEDURE — 1036F TOBACCO NON-USER: CPT | Performed by: NURSE PRACTITIONER

## 2024-07-15 RX ORDER — PROPRANOLOL HYDROCHLORIDE 20 MG/1
20 TABLET ORAL 2 TIMES DAILY PRN
Qty: 30 TABLET | Refills: 3 | Status: SHIPPED | OUTPATIENT
Start: 2024-07-15 | End: 2025-07-15

## 2024-07-15 RX ORDER — POLYETHYLENE GLYCOL 3350 17 G/17G
17 POWDER, FOR SOLUTION ORAL DAILY
Qty: 30 PACKET | Refills: 3 | Status: SHIPPED | OUTPATIENT
Start: 2024-07-15 | End: 2024-11-12

## 2024-07-15 RX ORDER — DOCUSATE SODIUM 100 MG/1
100 CAPSULE, LIQUID FILLED ORAL 2 TIMES DAILY
Qty: 60 CAPSULE | Refills: 0 | Status: SHIPPED | OUTPATIENT
Start: 2024-07-15

## 2024-07-15 ASSESSMENT — ENCOUNTER SYMPTOMS
LIGHT-HEADEDNESS: 0
OCCASIONAL FEELINGS OF UNSTEADINESS: 1
ALLERGIC/IMMUNOLOGIC NEGATIVE: 1
GASTROINTESTINAL NEGATIVE: 1
POLYPHAGIA: 0
DEPRESSION: 1
CONSTITUTIONAL NEGATIVE: 1
NERVOUS/ANXIOUS: 0
DIZZINESS: 0
WEAKNESS: 0
RESPIRATORY NEGATIVE: 1
WOUND: 0
PSYCHIATRIC NEGATIVE: 1
HEMATOLOGIC/LYMPHATIC NEGATIVE: 1
EYES NEGATIVE: 1
SLEEP DISTURBANCE: 0
MUSCULOSKELETAL NEGATIVE: 1
NEUROLOGICAL NEGATIVE: 1
LOSS OF SENSATION IN FEET: 0
CONFUSION: 0
CARDIOVASCULAR NEGATIVE: 1
ENDOCRINE NEGATIVE: 1
FACIAL ASYMMETRY: 0

## 2024-07-15 ASSESSMENT — ACTIVITIES OF DAILY LIVING (ADL)
GROCERY_SHOPPING: INDEPENDENT
MANAGING_FINANCES: INDEPENDENT
TAKING_MEDICATION: INDEPENDENT
DRESSING: INDEPENDENT
BATHING: INDEPENDENT
DOING_HOUSEWORK: INDEPENDENT

## 2024-07-15 ASSESSMENT — PATIENT HEALTH QUESTIONNAIRE - PHQ9
1. LITTLE INTEREST OR PLEASURE IN DOING THINGS: SEVERAL DAYS
SUM OF ALL RESPONSES TO PHQ9 QUESTIONS 1 AND 2: 2
2. FEELING DOWN, DEPRESSED OR HOPELESS: SEVERAL DAYS
10. IF YOU CHECKED OFF ANY PROBLEMS, HOW DIFFICULT HAVE THESE PROBLEMS MADE IT FOR YOU TO DO YOUR WORK, TAKE CARE OF THINGS AT HOME, OR GET ALONG WITH OTHER PEOPLE: SOMEWHAT DIFFICULT

## 2024-07-15 NOTE — PROGRESS NOTES
"Subjective   Reason for Visit: Becky Gonzalez is an 89 y.o. female here for a Medicare Wellness visit.          Reviewed all medications by prescribing practitioner or clinical pharmacist (such as prescriptions, OTCs, herbal therapies and supplements) and documented in the medical record.    HPI  Becky is here for Medicare wellness visit  She continues to complain of bladder issues   Unsure of medications if she is taking them correct.  APC discussed states brother helps   Council will order audiology testing   No falls   Still drives   Will complete bone density screening   Needs Handicap plaque   Patient Care Team:  ENID Daen-CNP as PCP - General (Internal Medicine)  Zoraida Miguel MD as PCP - [employer] PCP  Nikita Hernandez PA-C as Physician Assistant (Cardiology)     Review of Systems   Constitutional: Negative.    HENT: Negative.     Eyes: Negative.    Respiratory: Negative.     Cardiovascular: Negative.    Gastrointestinal: Negative.    Endocrine: Negative.  Negative for polyphagia.   Genitourinary: Negative.    Musculoskeletal: Negative.    Skin: Negative.  Negative for pallor, rash and wound.   Allergic/Immunologic: Negative.    Neurological: Negative.  Negative for dizziness, facial asymmetry, weakness and light-headedness.   Hematological: Negative.    Psychiatric/Behavioral: Negative.  Negative for confusion, sleep disturbance and suicidal ideas. The patient is not nervous/anxious.    All other systems reviewed and are negative.      Objective   Vitals:  /64 (BP Location: Left arm, Patient Position: Sitting, BP Cuff Size: Adult)   Pulse 64   Ht 1.676 m (5' 6\")   Wt 84.8 kg (187 lb)   SpO2 98%   BMI 30.18 kg/m²       Physical Exam  Vitals and nursing note reviewed.   Constitutional:       Appearance: Normal appearance. She is normal weight.   HENT:      Head: Normocephalic.      Nose: Nose normal.      Mouth/Throat:      Mouth: Mucous membranes are " moist.   Eyes:      Extraocular Movements: Extraocular movements intact.      Conjunctiva/sclera: Conjunctivae normal.      Pupils: Pupils are equal, round, and reactive to light.   Cardiovascular:      Rate and Rhythm: Normal rate and regular rhythm.      Pulses: Normal pulses.      Heart sounds: Normal heart sounds.   Pulmonary:      Effort: Pulmonary effort is normal.   Abdominal:      General: Abdomen is flat. Bowel sounds are normal.      Palpations: Abdomen is soft.   Musculoskeletal:         General: Normal range of motion.      Cervical back: Normal range of motion.   Skin:     General: Skin is warm and dry.   Neurological:      General: No focal deficit present.      Mental Status: She is alert and oriented to person, place, and time.   Psychiatric:         Mood and Affect: Mood normal.         Behavior: Behavior normal.         Assessment/Plan   1. Decreased bone density  - XR DEXA bone density; Future    2. Encounter for annual physical exam  - Albumin-Creatinine Ratio, Urine Random; Future    3. Low bone density in premenopausal patient  - XR DEXA bone density; Future    4. Constipation, chronic  - docusate sodium (Colace) 100 mg capsule; Take 1 capsule (100 mg) by mouth 2 times a day.  Dispense: 60 capsule; Refill: 0  - XR abdomen 2 views supine and erect or decub; Future  - polyethylene glycol (Glycolax, Miralax) 17 gram packet; Take 17 g by mouth once daily.  Dispense: 30 packet; Refill: 3    5. Generalized abdominal pain  - XR abdomen 2 views supine and erect or decub; Future    6. Anxiety, generalized  - Disability Placard    7. Weakness  - Disability Placard    8. At risk for falls  - Disability Placard    9. Encounter for hearing examination, unspecified whether abnormal findings    - Referral to Audiology; Future        FU in 1 months or sooner if problems arise

## 2024-07-23 ENCOUNTER — HOSPITAL ENCOUNTER (OUTPATIENT)
Dept: RADIOLOGY | Facility: CLINIC | Age: 89
Discharge: HOME | End: 2024-07-23
Payer: MEDICARE

## 2024-07-23 DIAGNOSIS — R10.84 GENERALIZED ABDOMINAL PAIN: ICD-10-CM

## 2024-07-23 DIAGNOSIS — K59.09 CONSTIPATION, CHRONIC: ICD-10-CM

## 2024-07-23 PROCEDURE — 74019 RADEX ABDOMEN 2 VIEWS: CPT

## 2024-07-23 PROCEDURE — 74019 RADEX ABDOMEN 2 VIEWS: CPT | Performed by: RADIOLOGY

## 2024-07-29 ENCOUNTER — APPOINTMENT (OUTPATIENT)
Dept: UROLOGY | Facility: CLINIC | Age: 89
End: 2024-07-29
Payer: MEDICARE

## 2024-07-29 DIAGNOSIS — R35.1 NOCTURIA: ICD-10-CM

## 2024-07-29 DIAGNOSIS — R35.0 INCREASED URINARY FREQUENCY: ICD-10-CM

## 2024-07-29 DIAGNOSIS — N32.81 OAB (OVERACTIVE BLADDER): Primary | ICD-10-CM

## 2024-07-29 LAB
POC APPEARANCE, URINE: CLEAR
POC BILIRUBIN, URINE: NEGATIVE
POC BLOOD, URINE: NEGATIVE
POC COLOR, URINE: YELLOW
POC GLUCOSE, URINE: NEGATIVE MG/DL
POC KETONES, URINE: NEGATIVE MG/DL
POC LEUKOCYTES, URINE: ABNORMAL
POC NITRITE,URINE: NEGATIVE
POC PH, URINE: 6.5 PH
POC PROTEIN, URINE: NEGATIVE MG/DL
POC SPECIFIC GRAVITY, URINE: 1.02
POC UROBILINOGEN, URINE: 0.2 EU/DL

## 2024-07-29 PROCEDURE — 81003 URINALYSIS AUTO W/O SCOPE: CPT | Performed by: NURSE PRACTITIONER

## 2024-07-29 PROCEDURE — 99214 OFFICE O/P EST MOD 30 MIN: CPT | Performed by: NURSE PRACTITIONER

## 2024-07-29 PROCEDURE — 87086 URINE CULTURE/COLONY COUNT: CPT

## 2024-07-29 PROCEDURE — 1159F MED LIST DOCD IN RCRD: CPT | Performed by: NURSE PRACTITIONER

## 2024-07-29 RX ORDER — SOLIFENACIN SUCCINATE 10 MG/1
10 TABLET, FILM COATED ORAL NIGHTLY
Qty: 30 TABLET | Refills: 5 | Status: SHIPPED | OUTPATIENT
Start: 2024-07-29 | End: 2025-01-25

## 2024-07-29 NOTE — PATIENT INSTRUCTIONS
Limit caffeine (Coke, coffee, tea) and carbonated beverages, especially within 4 hours of sleep  Limit any fluid intake within 4-6 hours of bedtime            Pelvic Floor PT Locations  [facility]  [address]  [place] [address]  [phone]      Hallie Rodriguez, PT  B/B [facility]  [address]  [place] [address]  [phone]      Asuncion Darnell, PT    B/B  [facility] [place]  [address] [place] [address]  [phone]    Shasha Lester, PT         B/B   [facility]  [address] [place] [address]  [phone]    Julia Hall, PT  (PRN)      B/B  Azucena Aguilera, PT       B  Heena Zheng PT          B [employer] [facility]  [address] [address]  [phone]      Carley Orozco, PT      B/B  Dinah Sanders, PT      B/B [facility]  [address]  [place] [address]  [phone]      Mark Vila, PT      B    [facility]  [address] [place] [address]  [phone]      Delma Gomes, PT (PRN)  B  Cait Cheek, PT              B/B [facility]  [address] [place] [address]  [phone]    Marti Douglas PT B [facility]  [address]  [place], [place] [id number] [phone]      Sparkle aCll, PT   B   [facility]  [address]  [place] [address]  [phone]      Ramona Wills, PT     B/B  Henna Romo, PT              B/B [facility]  [address] [place] [address]  [phone]    Alejandra Chew, PT   (incontinence only) [facility]  [address]  [facility]. [place] [id number] [phone]    Sada Robert PT   B       [facility]    [address] [place]  [address]  [phone]      Jennifer Agustin, PT     B  Selena Meneses PT    B [facility] [place]  [address] [place] [address]  [phone]    Selena Hinds, PT       B/B  Shasha Lester, PT         B  Alecia Negron PT     B/B [facility]  [address]  [place], [place] [id number] [phone]      Kathy Nael, PT         B/B  Judy Douglas, PT   B/B     [facility]  [address]  [place] [address]  [phone]      Henna Romo, PT                  B/B  Selena Boyle PT      B [facility], [address] [address]  [phone]    Alecia Negron, PT     B/B   males/females Athletes only:  [facility]  [address]  [place] [address] [phone]    Gretta Ryan, PT    B   [facility] [address]  [address] [place] [address]  [phone]    Niharika Pierce, PT B/B        B/B: [facility]el [facility] Bladder

## 2024-07-29 NOTE — PROGRESS NOTES
UROLOGIC INITIAL EVALUATION     PROBLEM LIST:  1. OAB (overactive bladder)  solifenacin (VESIcare) 10 mg tablet    DISCONTINUED: vibegron 75 mg tablet      2. Increased urinary frequency  Referral to Urology    POCT UA Automated manually resulted    Urine Culture    Referral to Physical Therapy      3. Nocturia  solifenacin (VESIcare) 10 mg tablet    DISCONTINUED: vibegron 75 mg tablet           HISTORY OF PRESENT ILLNESS:   Becky Gonzalez is a 89 y.o. with CAD, CKD3  Presents today for evaluation and management of urinary urgency  Seen unaccompanied  Reports bothersome urgency, nocturia x years  Worse x 2 months at current degree  No leakage/incontinence  No hematuria or dysuria  Drinks decaf coffee x 3 cups  One Coke with dinner  Sometime snacks if she can't sleep or wakes up at night, may sip a little Coke  Going to [place] at the end of the month    PAST MEDICAL HISTORY:  Past Medical History:   Diagnosis Date    Abnormal findings on diagnostic imaging of other specified body structures     Abnormal CT of the chest    Coronary artery disease 11/16/2023    Mild dz on 2007 CTA    Hypercholesterolemia 12/12/2023    PCP    Other specified anxiety disorders 07/19/2021    Situational anxiety    Personal history of other diseases of the circulatory system     History of abnormal electrocardiography    Personal history of other diseases of the nervous system and sense organs     History of hearing loss    Personal history of other medical treatment     H/O Doppler ultrasound    Personal history of other mental and behavioral disorders     History of anxiety       PAST SURGICAL HISTORY:  Past Surgical History:   Procedure Laterality Date    KNEE SURGERY  03/02/2020    Knee Surgery Right    OTHER SURGICAL HISTORY  03/16/2022    Neck surgery    OTHER SURGICAL HISTORY  04/28/2020    Colonoscopy        ALLERGIES:   No Known Allergies     MEDICATIONS:   Current Outpatient Medications on File Prior to Visit    Medication Sig Dispense Refill    apixaban (Eliquis) 5 mg tablet Take 1 tablet (5 mg) by mouth every 12 hours. 60 tablet 11    cholecalciferol (Vitamin D-3) 400 unit capsule Take by mouth.      docusate sodium (Colace) 100 mg capsule Take 1 capsule (100 mg) by mouth 2 times a day. 60 capsule 0    lovastatin (Mevacor) 40 mg tablet Take 1 tablet (40 mg) by mouth once daily. as directed 90 tablet 3    oxybutynin (Ditropan) 5 mg tablet Take 1 tablet (5 mg) by mouth 2 times a day. 60 tablet 5    polyethylene glycol (Glycolax, Miralax) 17 gram packet Take 17 g by mouth once daily. 30 packet 3    propranolol (Inderal) 20 mg tablet Take 1 tablet (20 mg) by mouth 2 times a day as needed (Anxiety). 30 tablet 3     No current facility-administered medications on file prior to visit.        SOCIAL HISTORY:  Patient  reports that she has never smoked. She has never used smokeless tobacco. She reports that she does not currently use alcohol. She reports that she does not use drugs.   Social History     Socioeconomic History    Marital status:      Spouse name: Not on file    Number of children: Not on file    Years of education: Not on file    Highest education level: Not on file   Occupational History    Not on file   Tobacco Use    Smoking status: Never    Smokeless tobacco: Never   Vaping Use    Vaping status: Never Used   Substance and Sexual Activity    Alcohol use: Not Currently    Drug use: Never    Sexual activity: Not on file   Other Topics Concern    Not on file   Social History Narrative    Not on file     Social Determinants of Health     Financial Resource Strain: Not on file   Food Insecurity: Not on file   Transportation Needs: Not on file   Physical Activity: Not on file   Stress: Not on file   Social Connections: Not on file   Intimate Partner Violence: Not on file   Housing Stability: Not on file       FAMILY HISTORY:  No family history on file.    REVIEW OF SYSTEMS:  All systems reviewed, pertinent  negatives as noted in HPI.     PHYSICAL EXAM:  There were no vitals taken for this visit.  Constitutional: Well-developed and well-nourished. No distress.    Head: Normocephalic and atraumatic.    Neck: Normal range of motion.     Pulmonary/Chest: Effort normal. No respiratory distress.   Abdominal: Non-distended.  : See below.  Integumentary: No rash or lesions visualized.  Musculoskeletal: Normal range of motion.    Neurological: Alert and oriented.  Psychiatric: Normal mood and affect. Thought content normal.      LABORATORY REVIEW:   Lab Results   Component Value Date    BUN 22 08/17/2023    CREATININE 1.09 (H) 08/17/2023     08/17/2023    K 4.3 08/17/2023     (H) 08/17/2023    CO2 25 08/17/2023    CALCIUM 9.3 08/17/2023      Lab Results   Component Value Date    WBC 14.0 (H) 08/17/2023    RBC 4.36 08/17/2023    HGB 13.3 08/17/2023    HCT 42.0 08/17/2023    MCV 96 08/17/2023    MCHC 31.7 (L) 08/17/2023    RDW 14.1 08/17/2023     08/17/2023        Urine dipstick shows positive for leukocytes.        Assessment:      1. OAB (overactive bladder)  solifenacin (VESIcare) 10 mg tablet    DISCONTINUED: vibegron 75 mg tablet      2. Increased urinary frequency  Referral to Urology    POCT UA Automated manually resulted    Urine Culture    Referral to Physical Therapy      3. Nocturia  solifenacin (VESIcare) 10 mg tablet    DISCONTINUED: vibegron 75 mg tablet          Becky Gonzalez is a 89 y.o. with urinary urgency & nocturia x years  Previously seen by Dr. Aparicio for similar issues  Symptoms consistent with OAB, exacerbated by caffeine/fluid intake  UA + leuks, no other s/s infection    Discussed natural hx of OAB and multimodal approach to treatment, including medication, physical therapy, and behavioural modification  Written information provided  Agreeable to plan as below     Plan:   Urine for culture  Start solifenacin 10 mg po q hs; vibegron would be preferable but cost  prohibitive  Refer to pelvic floor PT  RTC in 3-4 weeks for reassessment  Encouraged to contact us in the interim with any questions, concerns

## 2024-07-30 LAB — BACTERIA UR CULT: NORMAL

## 2024-08-12 ENCOUNTER — TELEPHONE (OUTPATIENT)
Dept: PRIMARY CARE | Facility: CLINIC | Age: 89
End: 2024-08-12
Payer: MEDICARE

## 2024-08-15 ENCOUNTER — APPOINTMENT (OUTPATIENT)
Dept: PRIMARY CARE | Facility: CLINIC | Age: 89
End: 2024-08-15
Payer: MEDICARE

## 2024-08-26 ENCOUNTER — TELEPHONE (OUTPATIENT)
Dept: PRIMARY CARE | Facility: CLINIC | Age: 89
End: 2024-08-26
Payer: MEDICARE

## 2024-08-26 NOTE — TELEPHONE ENCOUNTER
"Pt states she is trying to get in with an ent but her provider has left. She went to emergency room but states no one knows what they're doing. She would like to be seen by you. I offered her the opening on Friday but she declined. \"I can not wait that long.\"   "

## 2024-08-27 ENCOUNTER — OFFICE VISIT (OUTPATIENT)
Dept: PRIMARY CARE | Facility: CLINIC | Age: 89
End: 2024-08-27
Payer: MEDICARE

## 2024-08-27 VITALS
DIASTOLIC BLOOD PRESSURE: 64 MMHG | OXYGEN SATURATION: 96 % | HEART RATE: 73 BPM | BODY MASS INDEX: 29.86 KG/M2 | SYSTOLIC BLOOD PRESSURE: 112 MMHG | WEIGHT: 185 LBS

## 2024-08-27 DIAGNOSIS — H91.90 HEARING LOSS, UNSPECIFIED HEARING LOSS TYPE, UNSPECIFIED LATERALITY: Primary | ICD-10-CM

## 2024-08-27 DIAGNOSIS — R09.82 POST-NASAL DRIP: ICD-10-CM

## 2024-08-27 PROCEDURE — 1036F TOBACCO NON-USER: CPT | Performed by: STUDENT IN AN ORGANIZED HEALTH CARE EDUCATION/TRAINING PROGRAM

## 2024-08-27 PROCEDURE — 99213 OFFICE O/P EST LOW 20 MIN: CPT | Performed by: STUDENT IN AN ORGANIZED HEALTH CARE EDUCATION/TRAINING PROGRAM

## 2024-08-27 PROCEDURE — 1159F MED LIST DOCD IN RCRD: CPT | Performed by: STUDENT IN AN ORGANIZED HEALTH CARE EDUCATION/TRAINING PROGRAM

## 2024-08-27 RX ORDER — FLUTICASONE PROPIONATE 50 MCG
1 SPRAY, SUSPENSION (ML) NASAL DAILY
Qty: 16 G | Refills: 5 | Status: SHIPPED | OUTPATIENT
Start: 2024-08-27 | End: 2025-08-27

## 2024-08-27 ASSESSMENT — ENCOUNTER SYMPTOMS: DEPRESSION: 0

## 2024-08-27 ASSESSMENT — PATIENT HEALTH QUESTIONNAIRE - PHQ9
SUM OF ALL RESPONSES TO PHQ9 QUESTIONS 1 AND 2: 0
1. LITTLE INTEREST OR PLEASURE IN DOING THINGS: NOT AT ALL
2. FEELING DOWN, DEPRESSED OR HOPELESS: NOT AT ALL

## 2024-08-27 NOTE — PROGRESS NOTES
Subjective   Patient ID: Becky Gonzalez is a 89 y.o. female who presents for Earache (LT is worse) and Throat Problem.    HPI     Ear pain: has had ear pain for past 3 weeks. Had has 2-3 course of abx. And has improved, still gets dizzy when she stands up. Has chronic hearing loss and wants earing aids. She wants to know if she cna get hearing chris    Review of Systems   All other systems reviewed and are negative.      Objective   /64 (BP Location: Right arm, Patient Position: Sitting, BP Cuff Size: Large adult)   Pulse 73   Wt 83.9 kg (185 lb)   LMP  (LMP Unknown)   SpO2 96%   BMI 29.86 kg/m²     Physical Exam  Constitutional:       Appearance: Normal appearance.   HENT:      Head: Normocephalic and atraumatic.      Right Ear: Tympanic membrane and ear canal normal.      Left Ear: Tympanic membrane and ear canal normal.      Mouth/Throat:      Mouth: Mucous membranes are moist.      Pharynx: Oropharynx is clear.   Eyes:      Extraocular Movements: Extraocular movements intact.      Conjunctiva/sclera: Conjunctivae normal.      Pupils: Pupils are equal, round, and reactive to light.   Cardiovascular:      Rate and Rhythm: Normal rate and regular rhythm.      Pulses: Normal pulses.      Heart sounds: Normal heart sounds.   Pulmonary:      Effort: Pulmonary effort is normal.      Breath sounds: Normal breath sounds.   Abdominal:      General: Abdomen is flat. Bowel sounds are normal.      Palpations: Abdomen is soft.   Musculoskeletal:         General: Normal range of motion.      Cervical back: Normal range of motion and neck supple.   Skin:     General: Skin is warm and dry.      Capillary Refill: Capillary refill takes 2 to 3 seconds.   Neurological:      General: No focal deficit present.      Mental Status: She is alert and oriented to person, place, and time. Mental status is at baseline.   Psychiatric:         Mood and Affect: Mood normal.         Behavior: Behavior normal.          Thought Content: Thought content normal.         Judgment: Judgment normal.         Assessment/Plan   1. Hearing loss, unspecified hearing loss type, unspecified laterality  - recommend audiology to get hearing aid  - infection appears to have resoled  - reassurance

## 2024-08-28 ENCOUNTER — APPOINTMENT (OUTPATIENT)
Dept: UROLOGY | Facility: CLINIC | Age: 89
End: 2024-08-28
Payer: MEDICARE

## 2024-09-16 ENCOUNTER — APPOINTMENT (OUTPATIENT)
Dept: UROLOGY | Facility: CLINIC | Age: 89
End: 2024-09-16
Payer: MEDICARE

## 2024-09-16 VITALS — SYSTOLIC BLOOD PRESSURE: 142 MMHG | HEART RATE: 63 BPM | DIASTOLIC BLOOD PRESSURE: 82 MMHG

## 2024-09-16 DIAGNOSIS — N32.81 OAB (OVERACTIVE BLADDER): Primary | ICD-10-CM

## 2024-09-16 DIAGNOSIS — R35.1 NOCTURIA: ICD-10-CM

## 2024-09-16 DIAGNOSIS — R35.0 INCREASED URINARY FREQUENCY: ICD-10-CM

## 2024-09-16 PROCEDURE — 99214 OFFICE O/P EST MOD 30 MIN: CPT | Performed by: NURSE PRACTITIONER

## 2024-09-16 PROCEDURE — 1159F MED LIST DOCD IN RCRD: CPT | Performed by: NURSE PRACTITIONER

## 2024-09-16 PROCEDURE — 1036F TOBACCO NON-USER: CPT | Performed by: NURSE PRACTITIONER

## 2024-09-16 NOTE — PROGRESS NOTES
UROLOGIC FOLLOW UP     PROBLEM LIST:  1. OAB (overactive bladder)  vibegron 75 mg tablet      2. Nocturia        3. Increased urinary frequency            HISTORY OF PRESENT ILLNESS:   Becky Gonzalez is a 89 y.o. with urinary urgency & nocturia x years    INTERVAL HISTORY:  Returns for [place]  Seen unaccompanied  Solifenacin may have helped minimally  Frequency occasionally 1.5 hours instead of q 1 hr  Forgot she had an appointment 8/28  Notes more issues with mood, memory recently  Hasn't had time to go to pelvic floor PT    Leaving for [place] Saturday through the end of the year    PAST MEDICAL HISTORY:  Past Medical History:   Diagnosis Date    Abnormal findings on diagnostic imaging of other specified body structures     Abnormal CT of the chest    Coronary artery disease 11/16/2023    Mild dz on 2007 CTA    Hypercholesterolemia 12/12/2023    PCP    Other specified anxiety disorders 07/19/2021    Situational anxiety    Personal history of other diseases of the circulatory system     History of abnormal electrocardiography    Personal history of other diseases of the nervous system and sense organs     History of hearing loss    Personal history of other medical treatment     H/O Doppler ultrasound    Personal history of other mental and behavioral disorders     History of anxiety       PAST SURGICAL HISTORY:  Past Surgical History:   Procedure Laterality Date    KNEE SURGERY  03/02/2020    Knee Surgery Right    OTHER SURGICAL HISTORY  03/16/2022    Neck surgery    OTHER SURGICAL HISTORY  04/28/2020    Colonoscopy        ALLERGIES:   No Known Allergies     MEDICATIONS:   Current Outpatient Medications on File Prior to Visit   Medication Sig Dispense Refill    apixaban (Eliquis) 5 mg tablet Take 1 tablet (5 mg) by mouth every 12 hours. 60 tablet 11    cholecalciferol (Vitamin D-3) 400 unit capsule Take by mouth.      fluticasone (Flonase) 50 mcg/actuation nasal spray Administer 1 spray into each nostril  once daily. Shake gently. Before first use, prime pump. After use, clean tip and replace cap. 16 g 5    propranolol (Inderal) 20 mg tablet Take 1 tablet (20 mg) by mouth 2 times a day as needed (Anxiety). 30 tablet 3    solifenacin (VESIcare) 10 mg tablet Take 1 tablet (10 mg) by mouth once daily at bedtime. Swallow tablet whole; do not crush, chew, or split. 30 tablet 5     No current facility-administered medications on file prior to visit.        SOCIAL HISTORY:  Patient  reports that she has never smoked. She has never used smokeless tobacco. She reports that she does not currently use alcohol. She reports that she does not use drugs.   Social History     Socioeconomic History    Marital status:      Spouse name: Not on file    Number of children: Not on file    Years of education: Not on file    Highest education level: Not on file   Occupational History    Not on file   Tobacco Use    Smoking status: Never    Smokeless tobacco: Never   Vaping Use    Vaping status: Never Used   Substance and Sexual Activity    Alcohol use: Not Currently    Drug use: Never    Sexual activity: Not on file   Other Topics Concern    Not on file   Social History Narrative    Not on file     Social Determinants of Health     Financial Resource Strain: Not on file   Food Insecurity: Not on file   Transportation Needs: Not on file   Physical Activity: Not on file   Stress: Not on file   Social Connections: Not on file   Intimate Partner Violence: Not on file   Housing Stability: Not on file       FAMILY HISTORY:  No family history on file.    REVIEW OF SYSTEMS:  All systems reviewed, pertinent negatives as noted in HPI.     PHYSICAL EXAM:  Visit Vitals  /82   Pulse 63     Constitutional: Well-developed and well-nourished. No distress.    Head: Normocephalic and atraumatic.    Neck: Normal range of motion.     Pulmonary/Chest: Effort normal. No respiratory distress.   Abdominal: Non-distended.  : Deferred.  Integumentary:  No rash or lesions visualized.  Musculoskeletal: Normal range of motion.    Neurological: Alert and oriented.  Psychiatric: Normal mood and affect. Thought content normal.      LABORATORY REVIEW:   Lab Results   Component Value Date    BUN 22 08/17/2023    CREATININE 1.09 (H) 08/17/2023     08/17/2023    K 4.3 08/17/2023     (H) 08/17/2023    CO2 25 08/17/2023    CALCIUM 9.3 08/17/2023      Lab Results   Component Value Date    WBC 14.0 (H) 08/17/2023    RBC 4.36 08/17/2023    HGB 13.3 08/17/2023    HCT 42.0 08/17/2023    MCV 96 08/17/2023    MCHC 31.7 (L) 08/17/2023    RDW 14.1 08/17/2023     08/17/2023        Urine dipstick shows positive for leukocytes.        Assessment:      1. OAB (overactive bladder)  vibegron 75 mg tablet      2. Nocturia        3. Increased urinary frequency            Becky Gonzalez is a 89 y.o. with urinary urgency, leakage & nocturia x years  Previously seen by Dr. Aparicio for similar issues  Symptoms consistent with OAB, exacerbated by caffeine/fluid intake  No benefit with oxybutynin or low-dose solifenacin   Previously on mirabegron with some relief  Unable to provide urine specimen today     Plan:   Stop solifenacin  Trial of vibegron, 1 week supply provided  Encouraged to seek out pelvic floor PT either in-person or online  RTC in January on return from [place]  Encouraged to contact us in the interim with any questions, concerns

## 2024-09-16 NOTE — PATIENT INSTRUCTIONS
Start vibegron (Gemtesa in the [place], Obgemsa in [place]) 75 mg once a day  It takes about 2 weeks to know whether the medication is working; please see your physician in [place] to continue prescribing if this helps  If pelvic floor physical therapy is available while you're away, this could be very helpful - you could also look for pelvic floor therapy videos online (eg on YouTube)  Try taking Miralax at least once a day to help with regular bowel movements; you can take this 2-3 times a day if needed  If you have any questions or concerns please call:  For questions or concerns, please contact Wade Carlson MA, at [phone] between Monday-Friday 8- 4:40 pm.

## 2024-09-17 ENCOUNTER — TELEPHONE (OUTPATIENT)
Dept: UROLOGY | Facility: CLINIC | Age: 89
End: 2024-09-17
Payer: MEDICARE

## 2024-09-17 DIAGNOSIS — I48.0 PAROXYSMAL ATRIAL FIBRILLATION (MULTI): ICD-10-CM

## 2024-09-17 DIAGNOSIS — E78.00 HYPERCHOLESTEROLEMIA: ICD-10-CM

## 2024-09-17 RX ORDER — LOVASTATIN 40 MG/1
40 TABLET ORAL NIGHTLY
Qty: 90 TABLET | Refills: 3 | Status: SHIPPED | OUTPATIENT
Start: 2024-09-17 | End: 2025-09-17

## 2024-09-17 RX ORDER — LOVASTATIN 40 MG/1
40 TABLET ORAL NIGHTLY
COMMUNITY
End: 2024-09-17 | Stop reason: SDUPTHER

## 2025-01-16 ENCOUNTER — APPOINTMENT (OUTPATIENT)
Dept: UROLOGY | Facility: CLINIC | Age: OVER 89
End: 2025-01-16
Payer: MEDICARE

## 2025-06-20 DIAGNOSIS — I48.0 PAROXYSMAL ATRIAL FIBRILLATION (MULTI): ICD-10-CM

## 2025-06-23 ENCOUNTER — TELEPHONE (OUTPATIENT)
Dept: CARDIOLOGY | Facility: CLINIC | Age: OVER 89
End: 2025-06-23
Payer: MEDICARE

## 2025-06-23 NOTE — TELEPHONE ENCOUNTER
"Wants seen \"asap\"  has been [place] of [place] country and \"something is wrong with my heart\"  only wants to see Luis  koki Rudolph or Nikita pt refused both.    "

## 2025-06-24 PROBLEM — S93.601A SPRAIN OF RIGHT FOOT: Status: RESOLVED | Noted: 2023-05-30 | Resolved: 2025-06-24

## 2025-06-24 PROBLEM — M21.42 ACQUIRED PES PLANUS OF LEFT FOOT: Status: RESOLVED | Noted: 2023-05-30 | Resolved: 2025-06-24

## 2025-06-24 PROBLEM — M25.562 CHRONIC PAIN OF LEFT KNEE: Status: RESOLVED | Noted: 2023-12-12 | Resolved: 2025-06-24

## 2025-06-24 PROBLEM — J31.0 CHRONIC RHINITIS: Status: RESOLVED | Noted: 2023-12-12 | Resolved: 2025-06-24

## 2025-06-24 PROBLEM — M76.821 POSTERIOR TIBIAL TENDINITIS OF RIGHT LEG: Status: RESOLVED | Noted: 2023-05-30 | Resolved: 2025-06-24

## 2025-06-24 PROBLEM — R09.81 NASAL CONGESTION: Status: RESOLVED | Noted: 2023-12-12 | Resolved: 2025-06-24

## 2025-06-24 PROBLEM — R35.0 URINARY FREQUENCY: Status: RESOLVED | Noted: 2023-12-12 | Resolved: 2025-06-24

## 2025-06-24 PROBLEM — R13.10 DYSPHAGIA: Status: RESOLVED | Noted: 2023-12-12 | Resolved: 2025-06-24

## 2025-06-24 PROBLEM — J34.89 NASAL OBSTRUCTION: Status: RESOLVED | Noted: 2023-12-12 | Resolved: 2025-06-24

## 2025-06-24 PROBLEM — H25.12 AGE-RELATED NUCLEAR CATARACT OF LEFT EYE: Status: RESOLVED | Noted: 2023-12-12 | Resolved: 2025-06-24

## 2025-06-24 PROBLEM — R33.9 INCOMPLETE BLADDER EMPTYING: Status: RESOLVED | Noted: 2023-12-12 | Resolved: 2025-06-24

## 2025-06-24 PROBLEM — M20.61 DEFORMITY OF TOE OF RIGHT FOOT: Status: RESOLVED | Noted: 2023-05-30 | Resolved: 2025-06-24

## 2025-06-24 PROBLEM — M19.071 PRIMARY OSTEOARTHRITIS OF RIGHT ANKLE: Status: RESOLVED | Noted: 2023-05-30 | Resolved: 2025-06-24

## 2025-06-24 PROBLEM — G89.29 CHRONIC PAIN OF LEFT KNEE: Status: RESOLVED | Noted: 2023-12-12 | Resolved: 2025-06-24

## 2025-06-24 PROBLEM — H60.60 CHRONIC OTITIS EXTERNA: Status: RESOLVED | Noted: 2023-12-12 | Resolved: 2025-06-24

## 2025-06-24 PROBLEM — R05.9 COUGH: Status: RESOLVED | Noted: 2023-12-12 | Resolved: 2025-06-24

## 2025-06-24 PROBLEM — K59.09 CONSTIPATION, CHRONIC: Status: RESOLVED | Noted: 2023-12-12 | Resolved: 2025-06-24

## 2025-06-24 PROBLEM — M19.90 ARTHRITIS: Status: RESOLVED | Noted: 2023-12-12 | Resolved: 2025-06-24

## 2025-06-24 PROBLEM — M62.81 MUSCLE WEAKNESS (GENERALIZED): Status: RESOLVED | Noted: 2020-07-02 | Resolved: 2025-06-24

## 2025-06-24 PROBLEM — H35.363 DRUSEN (DEGENERATIVE) OF MACULA, BILATERAL: Status: RESOLVED | Noted: 2023-12-12 | Resolved: 2025-06-24

## 2025-06-24 PROBLEM — M25.371 RIGHT ANKLE INSTABILITY: Status: RESOLVED | Noted: 2023-05-30 | Resolved: 2025-06-24

## 2025-06-24 PROBLEM — M25.471 SWELLING OF RIGHT ANKLE JOINT: Status: RESOLVED | Noted: 2023-05-30 | Resolved: 2025-06-24

## 2025-06-24 PROBLEM — K63.5 COLON POLYPS: Status: RESOLVED | Noted: 2023-12-12 | Resolved: 2025-06-24

## 2025-06-24 PROBLEM — R10.30 LOWER ABDOMINAL PAIN: Status: RESOLVED | Noted: 2023-12-12 | Resolved: 2025-06-24

## 2025-06-24 PROBLEM — I48.0 PAROXYSMAL ATRIAL FIBRILLATION (MULTI): Chronic | Status: ACTIVE | Noted: 2023-10-25

## 2025-06-24 PROBLEM — M54.42 CHRONIC LEFT-SIDED LOW BACK PAIN WITH LEFT-SIDED SCIATICA: Status: RESOLVED | Noted: 2023-12-12 | Resolved: 2025-06-24

## 2025-06-24 PROBLEM — H25.11 AGE-RELATED NUCLEAR CATARACT OF RIGHT EYE: Status: RESOLVED | Noted: 2023-12-12 | Resolved: 2025-06-24

## 2025-06-24 PROBLEM — M21.41 ACQUIRED PES PLANUS OF RIGHT FOOT: Status: RESOLVED | Noted: 2023-05-30 | Resolved: 2025-06-24

## 2025-06-24 PROBLEM — G89.29 CHRONIC LEFT-SIDED LOW BACK PAIN WITH LEFT-SIDED SCIATICA: Status: RESOLVED | Noted: 2023-12-12 | Resolved: 2025-06-24

## 2025-06-24 PROBLEM — R32 URINARY INCONTINENCE: Status: RESOLVED | Noted: 2023-12-04 | Resolved: 2025-06-24

## 2025-06-24 PROBLEM — M17.9 OSTEOARTHRITIS OF KNEE: Status: RESOLVED | Noted: 2023-12-12 | Resolved: 2025-06-24

## 2025-06-24 PROBLEM — M79.674 PAIN IN TOE OF RIGHT FOOT: Status: RESOLVED | Noted: 2023-05-30 | Resolved: 2025-06-24

## 2025-06-24 NOTE — TELEPHONE ENCOUNTER
"Spoke with patient- states since returning from [place] has been having problems with Eliquis- unable to afford and would like to discuss alternatives, had an \"incident\" in [place] and would like to be seen for heart condition.   Able to offer patient appt 6/25 at 11am  and patient agreeable.    "

## 2025-06-25 ENCOUNTER — OFFICE VISIT (OUTPATIENT)
Dept: CARDIOLOGY | Facility: CLINIC | Age: OVER 89
End: 2025-06-25
Payer: MEDICARE

## 2025-06-25 VITALS
DIASTOLIC BLOOD PRESSURE: 84 MMHG | WEIGHT: 170 LBS | SYSTOLIC BLOOD PRESSURE: 136 MMHG | OXYGEN SATURATION: 97 % | HEART RATE: 60 BPM | BODY MASS INDEX: 27.44 KG/M2

## 2025-06-25 DIAGNOSIS — E78.00 HYPERCHOLESTEROLEMIA: Chronic | ICD-10-CM

## 2025-06-25 DIAGNOSIS — I25.10 CORONARY ARTERY DISEASE INVOLVING NATIVE CORONARY ARTERY OF NATIVE HEART WITHOUT ANGINA PECTORIS: Primary | Chronic | ICD-10-CM

## 2025-06-25 DIAGNOSIS — I48.0 PAROXYSMAL ATRIAL FIBRILLATION (MULTI): Chronic | ICD-10-CM

## 2025-06-25 DIAGNOSIS — F41.1 ANXIETY, GENERALIZED: ICD-10-CM

## 2025-06-25 PROCEDURE — 93000 ELECTROCARDIOGRAM COMPLETE: CPT | Performed by: INTERNAL MEDICINE

## 2025-06-25 PROCEDURE — 1036F TOBACCO NON-USER: CPT | Performed by: INTERNAL MEDICINE

## 2025-06-25 PROCEDURE — 1159F MED LIST DOCD IN RCRD: CPT | Performed by: INTERNAL MEDICINE

## 2025-06-25 PROCEDURE — 99214 OFFICE O/P EST MOD 30 MIN: CPT | Performed by: INTERNAL MEDICINE

## 2025-06-25 PROCEDURE — 1160F RVW MEDS BY RX/DR IN RCRD: CPT | Performed by: INTERNAL MEDICINE

## 2025-06-25 RX ORDER — RAMIPRIL 5 MG/1
2.5 CAPSULE ORAL DAILY
COMMUNITY

## 2025-06-25 RX ORDER — PROPRANOLOL HYDROCHLORIDE 20 MG/1
20 TABLET ORAL 2 TIMES DAILY PRN
Qty: 90 TABLET | Refills: 3 | Status: SHIPPED | OUTPATIENT
Start: 2025-06-25 | End: 2026-06-25

## 2025-06-25 NOTE — PROGRESS NOTES
Referred by No ref. provider found  HPI I'm seeing Becky for the 1st time in 2 years.  While in [place] she was off Eliquis for a period of time. She had a 10 min episode of sob and anxiety. This happened in 10/2024.   Past Medical History:  Problem List Items Addressed This Visit    None     Medical History[1]      Past Surgical History:  Surgical History[2]     Social History:  She reports that she has never smoked. She has never used smokeless tobacco. She reports that she does not currently use alcohol. She reports that she does not use drugs.  Family History:  Family History[3]   Allergies:  Patient has no known allergies.  Outpatient Medications:  Current Outpatient Medications   Medication Instructions    apixaban (ELIQUIS) 5 mg, oral, Every 12 hours    apixaban (ELIQUIS) 5 mg, oral, Every 12 hours    cholecalciferol (Vitamin D-3) 400 unit capsule oral    fluticasone (Flonase) 50 mcg/actuation nasal spray 1 spray, Each Nostril, Daily, Shake gently. Before first use, prime pump. After use, clean tip and replace cap.    lovastatin (MEVACOR) 40 mg, oral, Nightly    propranolol (INDERAL) 20 mg, oral, 2 times daily PRN    vibegron 75 mg, oral, Daily      Last Recorded Vitals:  There were no vitals filed for this visit.  Physical Exam  Patient is alert and oriented x3.  HEENT is unremarkable mucous members are moist  Neck no JVP no bruits upstrokes are full no thyromegaly  Lungs are clear bilaterally.  No wheezing crackles or rales  Heart regular rhythm normal S1-S2 there is no S3 no murmurs are heard.  Abdomen is soft. BS are positive nontender nondistended no organomegaly no pulsatile masses  Extremities have no edema.  Distal pulses present palpable.  Neuro is grossly nonfocal  Skin has no rashes   Last Labs:  CBC -  Lab Results   Component Value Date    WBC 14.0 (H) 08/17/2023    HGB 13.3 08/17/2023    HCT 42.0 08/17/2023    MCV 96 08/17/2023     08/17/2023    CREATININE 1.09 (H) 08/17/2023    BUN 22  "08/17/2023     CMP -  Lab Results   Component Value Date    CALCIUM 9.3 08/17/2023    PROT 6.6 08/17/2023    ALBUMIN 4.2 08/17/2023    AST 13 08/17/2023    ALT 10 08/17/2023    ALKPHOS 62 08/17/2023    BILITOT 0.4 08/17/2023     LIPID PANEL -   Lab Results   Component Value Date    CHOL 183 01/21/2021    HDL 53.4 01/21/2021    CHHDL 3.4 01/21/2021    VLDL 28 01/21/2021    TRIG 142 01/21/2021    LDLF 101 (H) 01/21/2021     RENAL FUNCTION PANEL -   Lab Results   Component Value Date    K 4.3 08/17/2023     No results found for: \"BNP\", \"HGBA1C\"       Procedure    AST 5/23/2024 normal.  EF 80%.    CAROTID [06/15/2022]: Less than 50% stenosis proximal KETURAH / LICA     Echo 9/2021 EF 55-60%,      CT [06/29/2020]: Dependent density seen in bilat lung bases w/o focal infiltrate, edema or effusion. Stable appearance of subcm nodular densities seen bilat, as detailed      EX NST [04/23/2020]: Normal â€“ 75% average function capacity for age. Nondiagnostic EKG due to chronotropic incompetence     CT [03/25/2020]: Multiple noncalcified nodules within bilateral lungs, as described above. These are stable since 2017. Given stability over a period of greater than two years, these are most likely benign.      Kidney U/S [03/25/2020]: Right kidney is again not visualized, consistent with severe atrophy. There are again multiple left renal cysts, similar to prior examination. No hydronephrosis or shadowing calculus within the left kidney.      CT [01/17/2019]: Stable bilateral lung nodules measuring up to 1.0 cm in greatest dimension dating back to May of 2017. Consider follow-up CT in 3-6 months to document 2 years of stability of the pulmonary nodules. No enlarging or new suspicious pulmonary nodules are identified. Stable cardiomegaly. Coronary artery calcifications. Stable, probable benign subcm left lobe of the liver simple cyst or hemangioma.      EX NST (01/09/2017) exercised 7.1 (METS 8.50) Normal 70%     ECHO (6/6/2007) " Normal LV 60% EF.     Assessment/Plan   1. Paroxysmal atrial fibrillation. Primarily when she was in the hospital in April 2022 with influenza. She has had episodes of atrial fibrillation and palpitations since then. This may just be related to stress. Continue anticoagulation with Eliquis and continue propranolol. EKG today.  She has been off propranolol for an unclear period of time.  This may account for the episode that she had in October as she may have had a paroxysm of A-fib.  I will restart propranolol 20 mg daily.     2. CAD. Mild coronary calcification noted on CAT scan. She has symptoms of chest discomfort. She believes they are more related to the stressors going on in her life. Given she has coronary disease, with the symptoms of chest pain I have asked for her to have a regadenoson nuclear stress test. AST 5/23/2024 normal.  EF 80%. EKG today. Continue lovastatin. Continue propranolol.     3. Palpitations. I do not believe this is related to the stress of her current life situation. I'll restart the propanolol     4. Hyperlipidemia. She is on lovastatin. Monitored by her primary care doctor.     5.  Hypertension.  She is now on ramipril in addition to starting propranolol.    EKG today.  RTC 1 year  Caterina Smith MD     Instructions and follow up         [1]   Past Medical History:  Diagnosis Date    Abnormal findings on diagnostic imaging of other specified body structures     Abnormal CT of the chest    Coronary artery disease 11/16/2023    Mild dz on 2007 CTA    Hypercholesterolemia 12/12/2023    PCP    Other specified anxiety disorders 07/19/2021    Situational anxiety    Personal history of other diseases of the circulatory system     History of abnormal electrocardiography    Personal history of other diseases of the nervous system and sense organs     History of hearing loss    Personal history of other medical treatment     H/O Doppler ultrasound    Personal history of other mental and behavioral  disorders     History of anxiety   [2]   Past Surgical History:  Procedure Laterality Date    KNEE SURGERY  03/02/2020    Knee Surgery Right    OTHER SURGICAL HISTORY  03/16/2022    Neck surgery    OTHER SURGICAL HISTORY  04/28/2020    Colonoscopy   [3] No family history on file.

## 2025-06-25 NOTE — PATIENT INSTRUCTIONS
1. Paroxysmal atrial fibrillation. Primarily when she was in the hospital in April 2022 with influenza. She has had episodes of atrial fibrillation and palpitations since then. This may just be related to stress. Continue anticoagulation with Eliquis and continue propranolol. EKG today.  She has been off propranolol for an unclear period of time.  This may account for the episode that she had in October as she may have had a paroxysm of A-fib.  I will restart propranolol 20 mg daily.     2. CAD. Mild coronary calcification noted on CAT scan. She has symptoms of chest discomfort. She believes they are more related to the stressors going on in her life. Given she has coronary disease, with the symptoms of chest pain I have asked for her to have a regadenoson nuclear stress test. AST 5/23/2024 normal.  EF 80%. EKG today. Continue lovastatin. Continue propranolol.     3. Palpitations. I do not believe this is related to the stress of her current life situation. I'll restart the propanolol     4. Hyperlipidemia. She is on lovastatin. Monitored by her primary care doctor.     5.  Hypertension.  She is now on ramipril in addition to starting propranolol.    EKG today.  RTC 1 year

## 2025-08-11 ENCOUNTER — TELEPHONE (OUTPATIENT)
Dept: CARDIOLOGY | Facility: CLINIC | Age: OVER 89
End: 2025-08-11
Payer: MEDICARE

## 2025-08-13 ENCOUNTER — HOSPITAL ENCOUNTER (EMERGENCY)
Facility: HOSPITAL | Age: OVER 89
Discharge: HOME | End: 2025-08-13
Attending: EMERGENCY MEDICINE
Payer: MEDICARE

## 2025-08-13 ENCOUNTER — APPOINTMENT (OUTPATIENT)
Dept: RADIOLOGY | Facility: HOSPITAL | Age: OVER 89
End: 2025-08-13
Payer: MEDICARE

## 2025-08-13 ENCOUNTER — APPOINTMENT (OUTPATIENT)
Dept: CARDIOLOGY | Facility: CLINIC | Age: OVER 89
End: 2025-08-13
Payer: MEDICARE

## 2025-08-13 VITALS
HEART RATE: 71 BPM | HEIGHT: 66 IN | BODY MASS INDEX: 27.32 KG/M2 | TEMPERATURE: 98.1 F | WEIGHT: 170 LBS | DIASTOLIC BLOOD PRESSURE: 81 MMHG | OXYGEN SATURATION: 99 % | SYSTOLIC BLOOD PRESSURE: 187 MMHG | RESPIRATION RATE: 16 BRPM

## 2025-08-13 DIAGNOSIS — K52.9 COLITIS: Primary | ICD-10-CM

## 2025-08-13 LAB
ALBUMIN SERPL BCP-MCNC: 4.1 G/DL (ref 3.4–5)
ALP SERPL-CCNC: 66 U/L (ref 33–136)
ALT SERPL W P-5'-P-CCNC: 7 U/L (ref 7–45)
ANION GAP SERPL CALC-SCNC: 12 MMOL/L (ref 10–20)
APPEARANCE UR: CLEAR
AST SERPL W P-5'-P-CCNC: 14 U/L (ref 9–39)
BASOPHILS # BLD AUTO: 0.06 X10*3/UL (ref 0–0.1)
BASOPHILS NFR BLD AUTO: 0.3 %
BILIRUB SERPL-MCNC: 0.5 MG/DL (ref 0–1.2)
BILIRUB UR STRIP.AUTO-MCNC: NEGATIVE MG/DL
BUN SERPL-MCNC: 25 MG/DL (ref 6–23)
CALCIUM SERPL-MCNC: 9 MG/DL (ref 8.6–10.3)
CHLORIDE SERPL-SCNC: 106 MMOL/L (ref 98–107)
CO2 SERPL-SCNC: 25 MMOL/L (ref 21–32)
COLOR UR: NORMAL
CREAT SERPL-MCNC: 1.25 MG/DL (ref 0.5–1.05)
EGFRCR SERPLBLD CKD-EPI 2021: 41 ML/MIN/1.73M*2
EOSINOPHIL # BLD AUTO: 0.11 X10*3/UL (ref 0–0.4)
EOSINOPHIL NFR BLD AUTO: 0.6 %
ERYTHROCYTE [DISTWIDTH] IN BLOOD BY AUTOMATED COUNT: 13.6 % (ref 11.5–14.5)
GLUCOSE SERPL-MCNC: 112 MG/DL (ref 74–99)
GLUCOSE UR STRIP.AUTO-MCNC: NORMAL MG/DL
HCT VFR BLD AUTO: 44.7 % (ref 36–46)
HGB BLD-MCNC: 13.6 G/DL (ref 12–16)
IMM GRANULOCYTES # BLD AUTO: 0.04 X10*3/UL (ref 0–0.5)
IMM GRANULOCYTES NFR BLD AUTO: 0.2 % (ref 0–0.9)
KETONES UR STRIP.AUTO-MCNC: NEGATIVE MG/DL
LEUKOCYTE ESTERASE UR QL STRIP.AUTO: NEGATIVE
LYMPHOCYTES # BLD AUTO: 10.33 X10*3/UL (ref 0.8–3)
LYMPHOCYTES NFR BLD AUTO: 60.2 %
MCH RBC QN AUTO: 30.4 PG (ref 26–34)
MCHC RBC AUTO-ENTMCNC: 30.4 G/DL (ref 32–36)
MCV RBC AUTO: 100 FL (ref 80–100)
MONOCYTES # BLD AUTO: 0.71 X10*3/UL (ref 0.05–0.8)
MONOCYTES NFR BLD AUTO: 4.1 %
NEUTROPHILS # BLD AUTO: 5.9 X10*3/UL (ref 1.6–5.5)
NEUTROPHILS NFR BLD AUTO: 34.6 %
NITRITE UR QL STRIP.AUTO: NEGATIVE
NRBC BLD-RTO: 0 /100 WBCS (ref 0–0)
PH UR STRIP.AUTO: 6 [PH]
PLATELET # BLD AUTO: 245 X10*3/UL (ref 150–450)
POTASSIUM SERPL-SCNC: 4.4 MMOL/L (ref 3.5–5.3)
PROT SERPL-MCNC: 6.7 G/DL (ref 6.4–8.2)
PROT UR STRIP.AUTO-MCNC: NEGATIVE MG/DL
RBC # BLD AUTO: 4.48 X10*6/UL (ref 4–5.2)
RBC # UR STRIP.AUTO: NEGATIVE MG/DL
RBC MORPH BLD: NORMAL
SODIUM SERPL-SCNC: 139 MMOL/L (ref 136–145)
SP GR UR STRIP.AUTO: 1.01
UROBILINOGEN UR STRIP.AUTO-MCNC: NORMAL MG/DL
WBC # BLD AUTO: 17.2 X10*3/UL (ref 4.4–11.3)

## 2025-08-13 PROCEDURE — 85025 COMPLETE CBC W/AUTO DIFF WBC: CPT | Performed by: EMERGENCY MEDICINE

## 2025-08-13 PROCEDURE — 81003 URINALYSIS AUTO W/O SCOPE: CPT | Performed by: EMERGENCY MEDICINE

## 2025-08-13 PROCEDURE — 36415 COLL VENOUS BLD VENIPUNCTURE: CPT | Performed by: EMERGENCY MEDICINE

## 2025-08-13 PROCEDURE — 74176 CT ABD & PELVIS W/O CONTRAST: CPT | Performed by: RADIOLOGY

## 2025-08-13 PROCEDURE — 99284 EMERGENCY DEPT VISIT MOD MDM: CPT | Mod: 25 | Performed by: EMERGENCY MEDICINE

## 2025-08-13 PROCEDURE — 80053 COMPREHEN METABOLIC PANEL: CPT | Performed by: EMERGENCY MEDICINE

## 2025-08-13 PROCEDURE — 74176 CT ABD & PELVIS W/O CONTRAST: CPT

## 2025-08-13 PROCEDURE — 2500000001 HC RX 250 WO HCPCS SELF ADMINISTERED DRUGS (ALT 637 FOR MEDICARE OP): Performed by: EMERGENCY MEDICINE

## 2025-08-13 RX ORDER — CIPROFLOXACIN 500 MG/1
500 TABLET, FILM COATED ORAL ONCE
Status: DISCONTINUED | OUTPATIENT
Start: 2025-08-13 | End: 2025-08-13

## 2025-08-13 RX ORDER — AMOXICILLIN AND CLAVULANATE POTASSIUM 875; 125 MG/1; MG/1
1 TABLET, FILM COATED ORAL EVERY 12 HOURS
Qty: 10 TABLET | Refills: 0 | Status: SHIPPED | OUTPATIENT
Start: 2025-08-13 | End: 2025-08-18

## 2025-08-13 RX ORDER — AMOXICILLIN AND CLAVULANATE POTASSIUM 875; 125 MG/1; MG/1
1 TABLET, FILM COATED ORAL ONCE
Status: COMPLETED | OUTPATIENT
Start: 2025-08-13 | End: 2025-08-13

## 2025-08-13 RX ORDER — METRONIDAZOLE 500 MG/1
500 TABLET ORAL ONCE
Status: DISCONTINUED | OUTPATIENT
Start: 2025-08-13 | End: 2025-08-13

## 2025-08-13 RX ADMIN — AMOXICILLIN AND CLAVULANATE POTASSIUM 1 TABLET: 875; 125 TABLET, COATED ORAL at 19:31

## 2025-08-13 ASSESSMENT — PAIN SCALES - GENERAL: PAINLEVEL_OUTOF10: 6

## 2025-08-13 ASSESSMENT — PAIN DESCRIPTION - LOCATION: LOCATION: ABDOMEN

## 2025-08-13 ASSESSMENT — PAIN - FUNCTIONAL ASSESSMENT: PAIN_FUNCTIONAL_ASSESSMENT: 0-10

## 2025-08-13 ASSESSMENT — PAIN DESCRIPTION - DESCRIPTORS: DESCRIPTORS: CRAMPING

## 2025-08-13 ASSESSMENT — PAIN DESCRIPTION - PAIN TYPE: TYPE: ACUTE PAIN

## 2025-08-14 LAB
HOLD SPECIMEN: NORMAL
PATH REVIEW-CBC DIFFERENTIAL: NORMAL

## 2025-08-20 ENCOUNTER — APPOINTMENT (OUTPATIENT)
Dept: CARDIOLOGY | Facility: CLINIC | Age: OVER 89
End: 2025-08-20
Payer: MEDICARE

## 2025-08-21 ENCOUNTER — APPOINTMENT (OUTPATIENT)
Dept: RADIOLOGY | Facility: HOSPITAL | Age: OVER 89
End: 2025-08-21
Payer: MEDICARE

## 2025-08-21 ENCOUNTER — HOSPITAL ENCOUNTER (EMERGENCY)
Facility: HOSPITAL | Age: OVER 89
Discharge: HOME | End: 2025-08-21
Attending: EMERGENCY MEDICINE
Payer: MEDICARE

## 2025-08-21 VITALS
TEMPERATURE: 98.8 F | DIASTOLIC BLOOD PRESSURE: 71 MMHG | HEART RATE: 51 BPM | WEIGHT: 164.46 LBS | SYSTOLIC BLOOD PRESSURE: 156 MMHG | RESPIRATION RATE: 16 BRPM | OXYGEN SATURATION: 98 % | BODY MASS INDEX: 26.43 KG/M2 | HEIGHT: 66 IN

## 2025-08-21 DIAGNOSIS — S22.060A COMPRESSION FRACTURE OF T8 VERTEBRA, INITIAL ENCOUNTER (MULTI): Primary | ICD-10-CM

## 2025-08-21 LAB
ALBUMIN SERPL BCP-MCNC: 3.6 G/DL (ref 3.4–5)
ALP SERPL-CCNC: 58 U/L (ref 33–136)
ALT SERPL W P-5'-P-CCNC: 6 U/L (ref 7–45)
ANION GAP SERPL CALC-SCNC: 11 MMOL/L (ref 10–20)
AST SERPL W P-5'-P-CCNC: 12 U/L (ref 9–39)
BASOPHILS # BLD AUTO: 0.07 X10*3/UL (ref 0–0.1)
BASOPHILS NFR BLD AUTO: 0.6 %
BILIRUB SERPL-MCNC: 0.5 MG/DL (ref 0–1.2)
BUN SERPL-MCNC: 25 MG/DL (ref 6–23)
CALCIUM SERPL-MCNC: 8.6 MG/DL (ref 8.6–10.3)
CHLORIDE SERPL-SCNC: 108 MMOL/L (ref 98–107)
CO2 SERPL-SCNC: 23 MMOL/L (ref 21–32)
CREAT SERPL-MCNC: 1.17 MG/DL (ref 0.5–1.05)
EGFRCR SERPLBLD CKD-EPI 2021: 44 ML/MIN/1.73M*2
EOSINOPHIL # BLD AUTO: 0.21 X10*3/UL (ref 0–0.4)
EOSINOPHIL NFR BLD AUTO: 1.7 %
ERYTHROCYTE [DISTWIDTH] IN BLOOD BY AUTOMATED COUNT: 13.6 % (ref 11.5–14.5)
GLUCOSE SERPL-MCNC: 111 MG/DL (ref 74–99)
HCT VFR BLD AUTO: 40.2 % (ref 36–46)
HGB BLD-MCNC: 12.6 G/DL (ref 12–16)
IMM GRANULOCYTES # BLD AUTO: 0.04 X10*3/UL (ref 0–0.5)
IMM GRANULOCYTES NFR BLD AUTO: 0.3 % (ref 0–0.9)
LYMPHOCYTES # BLD AUTO: 7.61 X10*3/UL (ref 0.8–3)
LYMPHOCYTES NFR BLD AUTO: 60.5 %
MCH RBC QN AUTO: 30.9 PG (ref 26–34)
MCHC RBC AUTO-ENTMCNC: 31.3 G/DL (ref 32–36)
MCV RBC AUTO: 99 FL (ref 80–100)
MONOCYTES # BLD AUTO: 0.8 X10*3/UL (ref 0.05–0.8)
MONOCYTES NFR BLD AUTO: 6.4 %
NEUTROPHILS # BLD AUTO: 3.84 X10*3/UL (ref 1.6–5.5)
NEUTROPHILS NFR BLD AUTO: 30.5 %
NRBC BLD-RTO: 0 /100 WBCS (ref 0–0)
PLATELET # BLD AUTO: 167 X10*3/UL (ref 150–450)
POTASSIUM SERPL-SCNC: 4.1 MMOL/L (ref 3.5–5.3)
PROT SERPL-MCNC: 6.1 G/DL (ref 6.4–8.2)
RBC # BLD AUTO: 4.08 X10*6/UL (ref 4–5.2)
SODIUM SERPL-SCNC: 138 MMOL/L (ref 136–145)
WBC # BLD AUTO: 12.6 X10*3/UL (ref 4.4–11.3)

## 2025-08-21 PROCEDURE — 96374 THER/PROPH/DIAG INJ IV PUSH: CPT

## 2025-08-21 PROCEDURE — 85025 COMPLETE CBC W/AUTO DIFF WBC: CPT

## 2025-08-21 PROCEDURE — 72131 CT LUMBAR SPINE W/O DYE: CPT

## 2025-08-21 PROCEDURE — 73552 X-RAY EXAM OF FEMUR 2/>: CPT | Mod: RT

## 2025-08-21 PROCEDURE — 80053 COMPREHEN METABOLIC PANEL: CPT

## 2025-08-21 PROCEDURE — 73552 X-RAY EXAM OF FEMUR 2/>: CPT | Mod: RIGHT SIDE | Performed by: RADIOLOGY

## 2025-08-21 PROCEDURE — 36415 COLL VENOUS BLD VENIPUNCTURE: CPT

## 2025-08-21 PROCEDURE — 72131 CT LUMBAR SPINE W/O DYE: CPT | Performed by: RADIOLOGY

## 2025-08-21 PROCEDURE — 72128 CT CHEST SPINE W/O DYE: CPT | Performed by: RADIOLOGY

## 2025-08-21 PROCEDURE — 99285 EMERGENCY DEPT VISIT HI MDM: CPT | Mod: 25 | Performed by: EMERGENCY MEDICINE

## 2025-08-21 PROCEDURE — 72128 CT CHEST SPINE W/O DYE: CPT

## 2025-08-21 PROCEDURE — 2500000004 HC RX 250 GENERAL PHARMACY W/ HCPCS (ALT 636 FOR OP/ED): Mod: JZ

## 2025-08-21 RX ORDER — HYDROCODONE BITARTRATE AND ACETAMINOPHEN 5; 325 MG/1; MG/1
1 TABLET ORAL EVERY 6 HOURS PRN
Qty: 12 TABLET | Refills: 0 | Status: SHIPPED | OUTPATIENT
Start: 2025-08-21 | End: 2025-08-24

## 2025-08-21 RX ADMIN — HYDROMORPHONE HYDROCHLORIDE 0.5 MG: 1 INJECTION, SOLUTION INTRAMUSCULAR; INTRAVENOUS; SUBCUTANEOUS at 14:35

## 2025-08-21 ASSESSMENT — PAIN - FUNCTIONAL ASSESSMENT: PAIN_FUNCTIONAL_ASSESSMENT: 0-10

## 2025-08-21 ASSESSMENT — PAIN SCALES - GENERAL: PAINLEVEL_OUTOF10: 8

## 2025-08-26 ENCOUNTER — TELEPHONE (OUTPATIENT)
Dept: PRIMARY CARE | Facility: CLINIC | Age: OVER 89
End: 2025-08-26
Payer: MEDICARE

## 2025-08-26 ENCOUNTER — APPOINTMENT (OUTPATIENT)
Dept: RADIOLOGY | Facility: HOSPITAL | Age: OVER 89
End: 2025-08-26
Payer: MEDICARE

## 2025-08-26 ENCOUNTER — APPOINTMENT (OUTPATIENT)
Dept: CARDIOLOGY | Facility: HOSPITAL | Age: OVER 89
End: 2025-08-26
Payer: MEDICARE

## 2025-08-26 ENCOUNTER — HOSPITAL ENCOUNTER (EMERGENCY)
Facility: HOSPITAL | Age: OVER 89
Discharge: HOME | End: 2025-08-26
Attending: EMERGENCY MEDICINE
Payer: MEDICARE

## 2025-08-26 VITALS
TEMPERATURE: 98.8 F | BODY MASS INDEX: 26.43 KG/M2 | OXYGEN SATURATION: 100 % | HEIGHT: 66 IN | HEART RATE: 44 BPM | SYSTOLIC BLOOD PRESSURE: 160 MMHG | DIASTOLIC BLOOD PRESSURE: 80 MMHG | RESPIRATION RATE: 19 BRPM | WEIGHT: 164.46 LBS

## 2025-08-26 DIAGNOSIS — M54.50 ACUTE MIDLINE LOW BACK PAIN WITHOUT SCIATICA: Primary | ICD-10-CM

## 2025-08-26 DIAGNOSIS — R41.82 ALTERED MENTAL STATUS, UNSPECIFIED ALTERED MENTAL STATUS TYPE: ICD-10-CM

## 2025-08-26 LAB
ACANTHOCYTES BLD QL SMEAR: NORMAL
ALBUMIN SERPL BCP-MCNC: 4 G/DL (ref 3.4–5)
ALP SERPL-CCNC: 72 U/L (ref 33–136)
ALT SERPL W P-5'-P-CCNC: 6 U/L (ref 7–45)
ANION GAP SERPL CALC-SCNC: 13 MMOL/L (ref 10–20)
APPEARANCE UR: CLEAR
AST SERPL W P-5'-P-CCNC: 14 U/L (ref 9–39)
BASOPHILS # BLD AUTO: 0.07 X10*3/UL (ref 0–0.1)
BASOPHILS NFR BLD AUTO: 0.5 %
BILIRUB SERPL-MCNC: 0.5 MG/DL (ref 0–1.2)
BILIRUB UR STRIP.AUTO-MCNC: NEGATIVE MG/DL
BNP SERPL-MCNC: 186 PG/ML (ref 0–99)
BUN SERPL-MCNC: 23 MG/DL (ref 6–23)
BURR CELLS BLD QL SMEAR: NORMAL
CALCIUM SERPL-MCNC: 9.6 MG/DL (ref 8.6–10.3)
CARDIAC TROPONIN I PNL SERPL HS: 6 NG/L (ref 0–13)
CHLORIDE SERPL-SCNC: 110 MMOL/L (ref 98–107)
CO2 SERPL-SCNC: 22 MMOL/L (ref 21–32)
COLOR UR: COLORLESS
CREAT SERPL-MCNC: 1.09 MG/DL (ref 0.5–1.05)
EGFRCR SERPLBLD CKD-EPI 2021: 48 ML/MIN/1.73M*2
EOSINOPHIL # BLD AUTO: 0.13 X10*3/UL (ref 0–0.4)
EOSINOPHIL NFR BLD AUTO: 0.9 %
ERYTHROCYTE [DISTWIDTH] IN BLOOD BY AUTOMATED COUNT: 13.4 % (ref 11.5–14.5)
GLUCOSE SERPL-MCNC: 99 MG/DL (ref 74–99)
GLUCOSE UR STRIP.AUTO-MCNC: NORMAL MG/DL
HCT VFR BLD AUTO: 44.5 % (ref 36–46)
HGB BLD-MCNC: 13.1 G/DL (ref 12–16)
IMM GRANULOCYTES # BLD AUTO: 0.05 X10*3/UL (ref 0–0.5)
IMM GRANULOCYTES NFR BLD AUTO: 0.3 % (ref 0–0.9)
KETONES UR STRIP.AUTO-MCNC: NEGATIVE MG/DL
LEUKOCYTE ESTERASE UR QL STRIP.AUTO: NEGATIVE
LYMPHOCYTES # BLD AUTO: 9.57 X10*3/UL (ref 0.8–3)
LYMPHOCYTES NFR BLD AUTO: 64.8 %
MAGNESIUM SERPL-MCNC: 2.19 MG/DL (ref 1.6–2.4)
MCH RBC QN AUTO: 30 PG (ref 26–34)
MCHC RBC AUTO-ENTMCNC: 29.4 G/DL (ref 32–36)
MCV RBC AUTO: 102 FL (ref 80–100)
MONOCYTES # BLD AUTO: 0.67 X10*3/UL (ref 0.05–0.8)
MONOCYTES NFR BLD AUTO: 4.5 %
NEUTROPHILS # BLD AUTO: 4.28 X10*3/UL (ref 1.6–5.5)
NEUTROPHILS NFR BLD AUTO: 29 %
NITRITE UR QL STRIP.AUTO: NEGATIVE
NRBC BLD-RTO: 0 /100 WBCS (ref 0–0)
OVALOCYTES BLD QL SMEAR: NORMAL
PH UR STRIP.AUTO: 6 [PH]
PLATELET # BLD AUTO: 217 X10*3/UL (ref 150–450)
POTASSIUM SERPL-SCNC: 4.5 MMOL/L (ref 3.5–5.3)
PROT SERPL-MCNC: 6.2 G/DL (ref 6.4–8.2)
PROT UR STRIP.AUTO-MCNC: NEGATIVE MG/DL
RBC # BLD AUTO: 4.37 X10*6/UL (ref 4–5.2)
RBC # UR STRIP.AUTO: NEGATIVE MG/DL
RBC MORPH BLD: NORMAL
SODIUM SERPL-SCNC: 140 MMOL/L (ref 136–145)
SP GR UR STRIP.AUTO: 1.01
UROBILINOGEN UR STRIP.AUTO-MCNC: NORMAL MG/DL
WBC # BLD AUTO: 14.8 X10*3/UL (ref 4.4–11.3)

## 2025-08-26 PROCEDURE — 83735 ASSAY OF MAGNESIUM: CPT

## 2025-08-26 PROCEDURE — 73502 X-RAY EXAM HIP UNI 2-3 VIEWS: CPT | Mod: RT

## 2025-08-26 PROCEDURE — 80053 COMPREHEN METABOLIC PANEL: CPT

## 2025-08-26 PROCEDURE — 2500000001 HC RX 250 WO HCPCS SELF ADMINISTERED DRUGS (ALT 637 FOR MEDICARE OP)

## 2025-08-26 PROCEDURE — 71046 X-RAY EXAM CHEST 2 VIEWS: CPT | Mod: FOREIGN READ | Performed by: RADIOLOGY

## 2025-08-26 PROCEDURE — 81003 URINALYSIS AUTO W/O SCOPE: CPT

## 2025-08-26 PROCEDURE — 83880 ASSAY OF NATRIURETIC PEPTIDE: CPT

## 2025-08-26 PROCEDURE — 36415 COLL VENOUS BLD VENIPUNCTURE: CPT

## 2025-08-26 PROCEDURE — 99285 EMERGENCY DEPT VISIT HI MDM: CPT | Mod: 25 | Performed by: EMERGENCY MEDICINE

## 2025-08-26 PROCEDURE — 73502 X-RAY EXAM HIP UNI 2-3 VIEWS: CPT | Mod: RIGHT SIDE | Performed by: RADIOLOGY

## 2025-08-26 PROCEDURE — 71046 X-RAY EXAM CHEST 2 VIEWS: CPT

## 2025-08-26 PROCEDURE — 93005 ELECTROCARDIOGRAM TRACING: CPT

## 2025-08-26 PROCEDURE — 70450 CT HEAD/BRAIN W/O DYE: CPT | Performed by: RADIOLOGY

## 2025-08-26 PROCEDURE — 85025 COMPLETE CBC W/AUTO DIFF WBC: CPT

## 2025-08-26 PROCEDURE — 70450 CT HEAD/BRAIN W/O DYE: CPT

## 2025-08-26 PROCEDURE — 84484 ASSAY OF TROPONIN QUANT: CPT

## 2025-08-26 RX ORDER — LIDOCAINE 50 MG/G
1 PATCH TOPICAL EVERY 24 HOURS
COMMUNITY
Start: 2025-08-19 | End: 2025-08-29

## 2025-08-26 RX ORDER — ACETAMINOPHEN 325 MG/1
975 TABLET ORAL ONCE
Status: COMPLETED | OUTPATIENT
Start: 2025-08-26 | End: 2025-08-26

## 2025-08-26 RX ADMIN — ACETAMINOPHEN 975 MG: 325 TABLET ORAL at 12:19

## 2025-08-26 ASSESSMENT — LIFESTYLE VARIABLES
HAVE YOU EVER FELT YOU SHOULD CUT DOWN ON YOUR DRINKING: NO
TOTAL SCORE: 0
EVER HAD A DRINK FIRST THING IN THE MORNING TO STEADY YOUR NERVES TO GET RID OF A HANGOVER: NO
HAVE PEOPLE ANNOYED YOU BY CRITICIZING YOUR DRINKING: NO
EVER FELT BAD OR GUILTY ABOUT YOUR DRINKING: NO

## 2025-08-26 ASSESSMENT — PAIN SCALES - GENERAL
PAINLEVEL_OUTOF10: 6
PAINLEVEL_OUTOF10: 8

## 2025-08-26 ASSESSMENT — PAIN DESCRIPTION - DESCRIPTORS: DESCRIPTORS: ACHING

## 2025-08-26 ASSESSMENT — PAIN - FUNCTIONAL ASSESSMENT: PAIN_FUNCTIONAL_ASSESSMENT: 0-10

## 2025-08-27 LAB — HOLD SPECIMEN: NORMAL

## 2025-08-28 LAB
ATRIAL RATE: 46 BPM
P AXIS: 52 DEGREES
PR INTERVAL: 182 MS
Q ONSET: 252 MS
QRS COUNT: 8 BEATS
QRS DURATION: 84 MS
QT INTERVAL: 487 MS
QTC CALCULATION(BAZETT): 426 MS
QTC FREDERICIA: 445 MS
R AXIS: 28 DEGREES
T AXIS: 19 DEGREES
T OFFSET: 496 MS
VENTRICULAR RATE: 46 BPM

## 2025-09-05 ENCOUNTER — OFFICE VISIT (OUTPATIENT)
Dept: ORTHOPEDIC SURGERY | Facility: CLINIC | Age: OVER 89
End: 2025-09-05
Payer: MEDICARE

## 2025-09-05 ENCOUNTER — HOSPITAL ENCOUNTER (OUTPATIENT)
Dept: RADIOLOGY | Facility: CLINIC | Age: OVER 89
Discharge: HOME | End: 2025-09-05
Payer: MEDICARE

## 2025-09-05 DIAGNOSIS — S22.060A COMPRESSION FRACTURE OF T8 VERTEBRA, INITIAL ENCOUNTER (MULTI): ICD-10-CM

## 2025-09-05 DIAGNOSIS — M54.50 LUMBAR PAIN: Primary | ICD-10-CM

## 2025-09-05 DIAGNOSIS — M54.50 LUMBAR PAIN: ICD-10-CM

## 2025-09-05 PROCEDURE — 99203 OFFICE O/P NEW LOW 30 MIN: CPT

## 2025-09-05 PROCEDURE — 72070 X-RAY EXAM THORAC SPINE 2VWS: CPT

## 2025-09-05 PROCEDURE — 1036F TOBACCO NON-USER: CPT

## 2025-09-05 PROCEDURE — 99202 OFFICE O/P NEW SF 15 MIN: CPT

## 2025-09-05 PROCEDURE — 72100 X-RAY EXAM L-S SPINE 2/3 VWS: CPT

## 2025-09-05 PROCEDURE — 1159F MED LIST DOCD IN RCRD: CPT

## 2025-09-05 ASSESSMENT — PAIN SCALES - GENERAL: PAINLEVEL_OUTOF10: 10 - WORST POSSIBLE PAIN

## 2025-09-05 ASSESSMENT — PAIN - FUNCTIONAL ASSESSMENT: PAIN_FUNCTIONAL_ASSESSMENT: 0-10

## 2025-09-16 ENCOUNTER — APPOINTMENT (OUTPATIENT)
Dept: PRIMARY CARE | Facility: CLINIC | Age: OVER 89
End: 2025-09-16
Payer: MEDICARE

## 2025-09-22 ENCOUNTER — APPOINTMENT (OUTPATIENT)
Dept: CARDIOLOGY | Facility: CLINIC | Age: OVER 89
End: 2025-09-22
Payer: MEDICARE

## 2025-09-24 ENCOUNTER — APPOINTMENT (OUTPATIENT)
Dept: CARDIOLOGY | Facility: CLINIC | Age: OVER 89
End: 2025-09-24
Payer: MEDICARE

## 2026-06-24 ENCOUNTER — APPOINTMENT (OUTPATIENT)
Dept: CARDIOLOGY | Facility: CLINIC | Age: OVER 89
End: 2026-06-24
Payer: MEDICARE